# Patient Record
Sex: FEMALE | Race: WHITE | Employment: UNEMPLOYED | URBAN - METROPOLITAN AREA
[De-identification: names, ages, dates, MRNs, and addresses within clinical notes are randomized per-mention and may not be internally consistent; named-entity substitution may affect disease eponyms.]

---
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
## 2022-02-03 ENCOUNTER — OFFICE VISIT (OUTPATIENT)
Dept: DERMATOLOGY | Facility: CLINIC | Age: 22
End: 2022-02-03
Payer: COMMERCIAL

## 2022-02-03 VITALS
DIASTOLIC BLOOD PRESSURE: 86 MMHG | OXYGEN SATURATION: 95 % | RESPIRATION RATE: 16 BRPM | SYSTOLIC BLOOD PRESSURE: 127 MMHG | HEART RATE: 55 BPM

## 2022-02-03 DIAGNOSIS — L63.9 ALOPECIA AREATA: Primary | ICD-10-CM

## 2022-02-03 PROCEDURE — 11901 INJECT SKIN LESIONS >7: CPT | Mod: GC

## 2022-02-03 ASSESSMENT — PAIN SCALES - GENERAL
PAINLEVEL: NO PAIN (0)
PAINLEVEL: NO PAIN (0)

## 2022-02-03 NOTE — Clinical Note
2/3/2022       RE: Garry Ferreira  [address] [address]     Dear Colleague,    Thank you for referring your patient, Garry Ferreira, to the [facility] DERMATOLOGY CLINIC [place] at [facility]. Please see a copy of my visit note below.    [facility] Dermatology Note  Encounter Date: Feb 3, 2022  Office Visit     Dermatology Problem List:  1.  Alopecia areata. Prior: tofacitinib 5mg BID (continued to progress), augmented betamethasone. S/p prednisone taper starting at 40mg tapered over 6 weeks (initiated 5/2021), now 5mg qd.   - Current tx: Prednisone 5mg (2.5mgX2) daily, Minoxidil 1.25mg (1/2tab) daily, IL-K injections (last 2/3/22), ketoconazole shampoo  ____________________________________________     Assessment & Plan:      # Alopecia Areata. Chronic. Steroid responsive with regular IL-K injections and prednisone burst for active disease. Previously on tofacitinib 5mg BID however did not see improvement and in fact progression continued while on this medication, thus patient preference to discontinue and we agree. Given continued progression despite increased amounts of intralesional steroid, patient completed prednisone taper starting at 40mg daily and tapered over 6 weeks. She is now continued on 5mg daily given her progress and risk of flare post-taper, with the addition of oral minoxidil 1.25mg daily. We are seeing what appears to be sustained improvement on the frontal anterior and scalp vertex while on this regimen and thus feel comfortable shifting the focus to the parietotemporal scalp at this time. Advised that she reach out immediately if any negative changes in the recently improved area.   - IL-K injections today (see procedure note below)    - Continue prednisone 5mg daily (2 pills of 2.5mg each, per day)  - Continue half-tablet of minoxidil daily (1.25mg per day)   - Continue ketoconazole shampoo  3-4x/week, leave on 5 min prior to rinsing   - Continue augmented betamethasone cream weekly (prefers cream to ointment)  - Continue latisse for eyebrows as needed       * Given degree of improvement on top of scalp today, the majority of IL-K today was injected into the fronto-parietal scalp bilaterally. Encouraged patient to continue watching for changes on the top of scalp and advised that she call for an appointment for injections in this area if any changes in the wrong direction, as we would like to maintain and not lose the progress we've made thus far. Such a change would qualify for an overbook for injections, if needed.      Procedures Performed:   - Intra-lesional triamcinolone procedure note. After positioning and cleansing with isopropyl alcohol, 4 total mL of triamcinolone 10 mg/mL was injected into 14 lesion(s) on the scalp. The patient tolerated the procedure well and left the dermatology clinic in good condition.     Follow-up: 6-8 week(s) in-person, or earlier for new or changing lesions    Staff and Resident:     Tammy Ospina MD PGY3    Patient was staffed with Dr. Marquez  ____________________________________________    CC: Derm Problem (Garry is here for an 8 week follow up and said things have been going about the same, she has no new areas of concern today.)    HPI:  Ms. Garry Ferreira is a(n) 22 year old female who presents today as a return patient for hair loss secondary to alopecia areata. She continues on 5mg prednisone and 1.25mg minoxidil since last visit. She continues to see growth along the areas injected at last visit, and she is happy with this improvement. Patient is otherwise feeling well, without additional skin concerns.    Labs Reviewed:  N/A    Physical Exam:  Vitals: There were no vitals taken for this visit.  SKIN: Focused examination of hair and scalp was performed.  - patchy areas on parietotemporal scalp persist with visible islands of regrowth; frontal hairline and  posterior scalp continues to improve  - eyebrows and eyelashes normal density   - hair pull test negative   - No other lesions of concern on areas examined.     Medications:  Current Outpatient Medications   Medication     augmented betamethasone dipropionate (DIPROLENE-AF) 0.05 % external cream     augmented betamethasone dipropionate (DIPROLENE-AF) 0.05 % external ointment     clobetasol (TEMOVATE) 0.05 % external cream     clobetasol propionate (CLOBEX) 0.05 % external shampoo     ketoconazole (NIZORAL) 2 % external shampoo     minoxidil (LONITEN) 2.5 MG tablet     predniSONE (DELTASONE) 2.5 MG tablet     tofacitinib (XELJANZ) 5 MG PO tablet     Current Facility-Administered Medications   Medication     triamcinolone acetonide (KENALOG-10) injection 20 mg     triamcinolone acetonide (KENALOG-10) injection 40 mg     triamcinolone acetonide (KENALOG-10) injection 40 mg     triamcinolone acetonide (KENALOG-10) injection 40 mg     triamcinolone acetonide (KENALOG-10) injection 40 mg     triamcinolone acetonide (KENALOG-10) injection 40 mg     triamcinolone acetonide (KENALOG-10) injection 46 mg      Past Medical History:   Patient Active Problem List   Diagnosis     Alopecia areata     Medication monitoring encounter     No past medical history on file.        Again, thank you for allowing me to participate in the care of your patient.      Sincerely,    Dago Ospina MD

## 2022-02-03 NOTE — LETTER
Date:February 11, 2022      Patient was self referred, no letter generated. Do not send.        [facility] Health Information

## 2022-02-03 NOTE — NURSING NOTE
Drug Administration Record    Prior to injection, verified patient identity using patient's name and date of birth.  Due to injection administration, patient instructed to remain in clinic for 15 minutes  afterwards, and to report any adverse reaction to me immediately.    Drug Name: triamcinolone acetonide(kenalog)  Dose: 3 mL of triamcinolone 10mg/mL, 30 mg dose  Route administered: ID  NDC #: Kenalog-10 (3865-1691-39)  Amount of waste(mL): 2  Reason for waste: Single use vial    LOT #: [id number]  SITE: See providers note  : [employer] 3D  EXPIRATION DATE: 04/2023

## 2022-02-03 NOTE — PROGRESS NOTES
[facility] Dermatology Note  Encounter Date: Feb 3, 2022  Office Visit     Dermatology Problem List:  1.  Alopecia areata. Prior: tofacitinib 5mg BID (continued to progress), augmented betamethasone. S/p prednisone taper starting at 40mg tapered over 6 weeks (initiated 5/2021), now 5mg qd.   - Current tx: Prednisone 5mg (2.5mgX2) daily, Minoxidil 1.25mg (1/2tab) daily, IL-K injections (last 2/3/22), ketoconazole shampoo  ____________________________________________     Assessment & Plan:      # Alopecia Areata. Chronic. Steroid responsive with regular IL-K injections and prednisone burst for active disease. Previously on tofacitinib 5mg BID however did not see improvement and in fact progression continued while on this medication, thus patient preference to discontinue and we agree. Given continued progression despite increased amounts of intralesional steroid, patient completed prednisone taper starting at 40mg daily and tapered over 6 weeks. She is now continued on 5mg daily given her progress and risk of flare post-taper, with the addition of oral minoxidil 1.25mg daily. We are seeing what appears to be sustained improvement on the frontal anterior and scalp vertex while on this regimen and thus feel comfortable shifting the focus to the parietotemporal scalp at this time. Advised that she reach out immediately if any negative changes in the recently improved area.   - IL-K injections today (see procedure note below)    - Continue prednisone 5mg daily (2 pills of 2.5mg each, per day)  - Continue half-tablet of minoxidil daily (1.25mg per day)   - Continue ketoconazole shampoo 3-4x/week, leave on 5 min prior to rinsing   - Continue augmented betamethasone cream weekly (prefers cream to ointment)  - Continue latisse for eyebrows as needed       * Given degree of improvement on top of scalp today, the majority of IL-K today was injected into the fronto-parietal scalp bilaterally. Encouraged  patient to continue watching for changes on the top of scalp and advised that she call for an appointment for injections in this area if any changes in the wrong direction, as we would like to maintain and not lose the progress we've made thus far. Such a change would qualify for an overbook for injections, if needed.      Procedures Performed:   - Intra-lesional triamcinolone procedure note. After positioning and cleansing with isopropyl alcohol, 4 total mL of triamcinolone 10 mg/mL was injected into 14 lesion(s) on the scalp. The patient tolerated the procedure well and left the dermatology clinic in good condition.     Follow-up: 6-8 week(s) in-person, or earlier for new or changing lesions    Staff and Resident:     Tammy Ospina MD PGY3    Patient was staffed with Dr. Melvin    Patient was seen and examined with the dermatology resident. I agree with the history, review of systems, physical examination, assessments and plan. I was present for the key portion of the ILK procedure.    Sandhya Melvin MD  Professor and  Chair  Department of Dermatology  [facility]  ____________________________________________    CC: Derm Problem (Garry is here for an 8 week follow up and said things have been going about the same, she has no new areas of concern today.)    HPI:  Ms. Garry Ferreira is a(n) 22 year old female who presents today as a return patient for hair loss secondary to alopecia areata. She continues on 5mg prednisone and 1.25mg minoxidil since last visit. She continues to see growth along the areas injected at last visit, and she is happy with this improvement. Patient is otherwise feeling well, without additional skin concerns.    Labs Reviewed:  N/A    Physical Exam:  Vitals: There were no vitals taken for this visit.  SKIN: Focused examination of hair and scalp was performed.  - patchy areas on parietotemporal scalp persist with visible islands of regrowth; frontal hairline and posterior  scalp continues to improve  - eyebrows and eyelashes normal density   - hair pull test negative   - No other lesions of concern on areas examined.     Medications:  Current Outpatient Medications   Medication     augmented betamethasone dipropionate (DIPROLENE-AF) 0.05 % external cream     augmented betamethasone dipropionate (DIPROLENE-AF) 0.05 % external ointment     clobetasol (TEMOVATE) 0.05 % external cream     clobetasol propionate (CLOBEX) 0.05 % external shampoo     ketoconazole (NIZORAL) 2 % external shampoo     minoxidil (LONITEN) 2.5 MG tablet     predniSONE (DELTASONE) 2.5 MG tablet     tofacitinib (XELJANZ) 5 MG PO tablet     Current Facility-Administered Medications   Medication     triamcinolone acetonide (KENALOG-10) injection 20 mg     triamcinolone acetonide (KENALOG-10) injection 40 mg     triamcinolone acetonide (KENALOG-10) injection 40 mg     triamcinolone acetonide (KENALOG-10) injection 40 mg     triamcinolone acetonide (KENALOG-10) injection 40 mg     triamcinolone acetonide (KENALOG-10) injection 40 mg     triamcinolone acetonide (KENALOG-10) injection 46 mg      Past Medical History:   Patient Active Problem List   Diagnosis     Alopecia areata     Medication monitoring encounter     No past medical history on file.

## 2022-02-03 NOTE — NURSING NOTE
Chief Complaint   Patient presents with     Derm Problem     Garry is here for an 8 week follow up and said things have been going about the same, she has no new areas of concern today.     León Oneal, Paramedic

## 2022-03-31 ENCOUNTER — OFFICE VISIT (OUTPATIENT)
Dept: DERMATOLOGY | Facility: CLINIC | Age: 22
End: 2022-03-31
Payer: COMMERCIAL

## 2022-03-31 DIAGNOSIS — L63.9 ALOPECIA AREATA: Primary | ICD-10-CM

## 2022-03-31 DIAGNOSIS — Z51.81 MEDICATION MONITORING ENCOUNTER: ICD-10-CM

## 2022-03-31 PROCEDURE — 11901 INJECT SKIN LESIONS >7: CPT | Mod: GC

## 2022-03-31 ASSESSMENT — PAIN SCALES - GENERAL: PAINLEVEL: NO PAIN (0)

## 2022-03-31 NOTE — LETTER
3/31/2022       RE: Garry Ferreira  [address] [facility] [address]     Dear Colleague,    Thank you for referring your patient, Garry Ferreira, to the [facility] DERMATOLOGY CLINIC [place] at [facility]. Please see a copy of my visit note below.    [facility] Dermatology Note  Encounter Date: Mar 31, 2022  Office Visit     Dermatology Problem List:  1.  Alopecia areata. Prior: tofacitinib 5mg BID (continued to progress), augmented betamethasone. S/p prednisone taper starting at 40mg tapered over 6 weeks (initiated 5/2021), now 5mg qd.   - Current tx: Prednisone 5mg (2.5mgX2) daily, Minoxidil 1.25mg (1/2tab) daily, IL-K injections (last 2/3/22), ketoconazole shampoo  ____________________________________________     Assessment & Plan:      # Alopecia Areata. Chronic. Steroid responsive with regular IL-K injections and prednisone burst for active disease. Previously on tofacitinib 5mg BID however did not see improvement and in fact progression continued while on this medication, thus patient preference to discontinue and we agree. Given continued progression despite increased amounts of intralesional steroid, patient completed prednisone taper starting at 40mg daily and tapered over 6 weeks. She is now continued on 5mg daily given her progress and risk of flare post-taper, with the addition of oral minoxidil 1.25mg daily. We are seeing what appears to be sustained improvement on the frontal anterior and scalp vertex while on this regimen and thus feel comfortable shifting the focus to the parietotemporal scalp at this time. Advised that she reach out immediately if any negative changes in the recently improved area.   - IL-K injections today (see procedure note below)    - Continue prednisone 5mg daily (2 pills of 2.5mg each, per day)  - Continue half-tablet of minoxidil daily (1.25mg per day)   - Continue ketoconazole shampoo  3-4x/week, leave on 5 min prior to rinsing   - Continue augmented betamethasone cream weekly (prefers cream to ointment)  - Continue latisse for eyebrows as needed       Procedures Performed:   - Intra-lesional triamcinolone procedure note. After positioning and cleansing with isopropyl alcohol, 4 total mL of triamcinolone 10 mg/mL was injected into 14 lesion(s) on the scalp. The patient tolerated the procedure well and left the dermatology clinic in good condition.     Follow-up: 6-8 week(s) in-person, or earlier for new or changing lesions    Staff and Resident:     Tammy Ospina MD PGY3    Patient was staffed with Dr. Melvin    Patient was seen and examined with the dermatology resident. I agree with the history, review of systems, physical examination, assessments and plan. I was present for the key portion of the ILK procedure.    Sandhya Melvin MD  Professor and  Chair  Department of Dermatology  [facility]        ____________________________________________    CC: Hair/Scalp Problem (hairloss)    HPI:  Ms. Garry Ferreira is a(n) 22 year old female who presents today as a return patient for hair loss secondary to alopecia areata. She continues on 5mg prednisone and 1.25mg minoxidil since last visit. She continues to see growth along the areas injected at last visit, and she is happy with this improvement. Patient is otherwise feeling well, without additional skin concerns.    Labs Reviewed:  N/A    Physical Exam:  SKIN: Focused examination of hair and scalp was performed.  - patchy areas on parietotemporal scalp persist with visible islands of regrowth; frontal hairline and posterior scalp continues to improve (see photos)  - eyebrows and eyelashes normal density   - hair pull test negative   - No other lesions of concern on areas examined.     Medications:  Current Outpatient Medications   Medication     augmented betamethasone dipropionate (DIPROLENE-AF) 0.05 % external cream     augmented  betamethasone dipropionate (DIPROLENE-AF) 0.05 % external ointment     clobetasol (TEMOVATE) 0.05 % external cream     clobetasol propionate (CLOBEX) 0.05 % external shampoo     ketoconazole (NIZORAL) 2 % external shampoo     minoxidil (LONITEN) 2.5 MG tablet     predniSONE (DELTASONE) 2.5 MG tablet     tofacitinib (XELJANZ) 5 MG PO tablet     Current Facility-Administered Medications   Medication     triamcinolone acetonide (KENALOG-10) injection 20 mg     triamcinolone acetonide (KENALOG-10) injection 40 mg     triamcinolone acetonide (KENALOG-10) injection 40 mg     triamcinolone acetonide (KENALOG-10) injection 40 mg     triamcinolone acetonide (KENALOG-10) injection 40 mg     triamcinolone acetonide (KENALOG-10) injection 40 mg     triamcinolone acetonide (KENALOG-10) injection 40 mg     triamcinolone acetonide (KENALOG-10) injection 46 mg      Past Medical History:   Patient Active Problem List   Diagnosis     Alopecia areata     Medication monitoring encounter     No past medical history on file.      Again, thank you for allowing me to participate in the care of your patient.      Sincerely,    Dago Ospina MD

## 2022-03-31 NOTE — NURSING NOTE
Drug Administration Record    Prior to injection, verified patient identity using patient's name and date of birth.  Due to injection administration, patient instructed to remain in clinic for 15 minutes  afterwards, and to report any adverse reaction to me immediately.    Drug Name: triamcinolone acetonide(kenalog)  Dose: 4mL of triamcinolone 10mg/mL, 40mg dose  Route administered: ID  NDC #: Kenalog-10 (8796-3568-31)  Amount of waste(mL):1mL  Reason for waste: Multi dose vial    LOT #: [id number]  SITE: [facility]  : [id number]  EXPIRATION DATE: 05/2023

## 2022-03-31 NOTE — LETTER
Date:April 18, 2022      Patient was self referred, no letter generated. Do not send.        [facility] Health Information

## 2022-03-31 NOTE — NURSING NOTE
Dermatology Rooming Note    Garry Ferreira's goals for this visit include:   Chief Complaint   Patient presents with     Hair/Scalp Problem     billielovaleriano Goldman, Visit Facilitator

## 2022-03-31 NOTE — PROGRESS NOTES
[facility] Dermatology Note  Encounter Date: Mar 31, 2022  Office Visit     Dermatology Problem List:  1.  Alopecia areata. Prior: tofacitinib 5mg BID (continued to progress), augmented betamethasone. S/p prednisone taper starting at 40mg tapered over 6 weeks (initiated 5/2021), now 5mg qd.   - Current tx: Prednisone 5mg (2.5mgX2) daily, Minoxidil 1.25mg (1/2tab) daily, IL-K injections (last 2/3/22), ketoconazole shampoo  ____________________________________________     Assessment & Plan:      # Alopecia Areata. Chronic. Steroid responsive with regular IL-K injections and prednisone burst for active disease. Previously on tofacitinib 5mg BID however did not see improvement and in fact progression continued while on this medication, thus patient preference to discontinue and we agree. Given continued progression despite increased amounts of intralesional steroid, patient completed prednisone taper starting at 40mg daily and tapered over 6 weeks. She is now continued on 5mg daily given her progress and risk of flare post-taper, with the addition of oral minoxidil 1.25mg daily. We are seeing what appears to be sustained improvement on the frontal anterior and scalp vertex while on this regimen and thus feel comfortable shifting the focus to the parietotemporal scalp at this time. Advised that she reach out immediately if any negative changes in the recently improved area.   - IL-K injections today (see procedure note below)    - Continue prednisone 5mg daily (2 pills of 2.5mg each, per day)  - Continue half-tablet of minoxidil daily (1.25mg per day)   - Continue ketoconazole shampoo 3-4x/week, leave on 5 min prior to rinsing   - Continue augmented betamethasone cream weekly (prefers cream to ointment)  - Continue latisse for eyebrows as needed       Procedures Performed:   - Intra-lesional triamcinolone procedure note. After positioning and cleansing with isopropyl alcohol, 4 total mL of  triamcinolone 10 mg/mL was injected into 14 lesion(s) on the scalp. The patient tolerated the procedure well and left the dermatology clinic in good condition.     Follow-up: 6-8 week(s) in-person, or earlier for new or changing lesions    Staff and Resident:     Tammy Ospina MD PGY3    Patient was staffed with Dr. Melvin    Patient was seen and examined with the dermatology resident. I agree with the history, review of systems, physical examination, assessments and plan. I was present for the key portion of the ILK procedure.    Sandhya Melvin MD  Professor and  Chair  Department of Dermatology  [facility]        ____________________________________________    CC: Hair/Scalp Problem (hairloss)    HPI:  Ms. Garry Ferreira is a(n) 22 year old female who presents today as a return patient for hair loss secondary to alopecia areata. She continues on 5mg prednisone and 1.25mg minoxidil since last visit. She continues to see growth along the areas injected at last visit, and she is happy with this improvement. Patient is otherwise feeling well, without additional skin concerns.    Labs Reviewed:  N/A    Physical Exam:  SKIN: Focused examination of hair and scalp was performed.  - patchy areas on parietotemporal scalp persist with visible islands of regrowth; frontal hairline and posterior scalp continues to improve (see photos)  - eyebrows and eyelashes normal density   - hair pull test negative   - No other lesions of concern on areas examined.     Medications:  Current Outpatient Medications   Medication     augmented betamethasone dipropionate (DIPROLENE-AF) 0.05 % external cream     augmented betamethasone dipropionate (DIPROLENE-AF) 0.05 % external ointment     clobetasol (TEMOVATE) 0.05 % external cream     clobetasol propionate (CLOBEX) 0.05 % external shampoo     ketoconazole (NIZORAL) 2 % external shampoo     minoxidil (LONITEN) 2.5 MG tablet     predniSONE (DELTASONE) 2.5 MG tablet      tofacitinib (XELJANZ) 5 MG PO tablet     Current Facility-Administered Medications   Medication     triamcinolone acetonide (KENALOG-10) injection 20 mg     triamcinolone acetonide (KENALOG-10) injection 40 mg     triamcinolone acetonide (KENALOG-10) injection 40 mg     triamcinolone acetonide (KENALOG-10) injection 40 mg     triamcinolone acetonide (KENALOG-10) injection 40 mg     triamcinolone acetonide (KENALOG-10) injection 40 mg     triamcinolone acetonide (KENALOG-10) injection 40 mg     triamcinolone acetonide (KENALOG-10) injection 46 mg      Past Medical History:   Patient Active Problem List   Diagnosis     Alopecia areata     Medication monitoring encounter     No past medical history on file.

## 2022-05-19 ENCOUNTER — OFFICE VISIT (OUTPATIENT)
Dept: DERMATOLOGY | Facility: CLINIC | Age: 22
End: 2022-05-19
Payer: COMMERCIAL

## 2022-05-19 VITALS — DIASTOLIC BLOOD PRESSURE: 76 MMHG | SYSTOLIC BLOOD PRESSURE: 122 MMHG

## 2022-05-19 DIAGNOSIS — M35.9 AUTOIMMUNE DISEASE (H): ICD-10-CM

## 2022-05-19 DIAGNOSIS — L63.9 ALOPECIA AREATA: Primary | ICD-10-CM

## 2022-05-19 DIAGNOSIS — Z51.81 MEDICATION MONITORING ENCOUNTER: ICD-10-CM

## 2022-05-19 PROCEDURE — 11901 INJECT SKIN LESIONS >7: CPT | Mod: GC

## 2022-05-19 ASSESSMENT — PAIN SCALES - GENERAL: PAINLEVEL: NO PAIN (0)

## 2022-05-19 NOTE — LETTER
5/19/2022       RE: Garry Ferreira  [address] [facility] [address]     Dear Colleague,    Thank you for referring your patient, Garry Ferreira, to the [facility] DERMATOLOGY CLINIC [place] at [facility]. Please see a copy of my visit note below.    [facility] Dermatology Note  Encounter Date: May 19, 2022  Office Visit     Dermatology Problem List:  1.  Alopecia areata. Prior: tofacitinib 5mg BID (continued to progress), augmented betamethasone. S/p prednisone taper starting at 40mg tapered over 6 weeks (initiated 5/2021), now 5mg qd.   - Current tx: Prednisone 5mg (2.5mgX2) daily, Minoxidil 1.25mg (1/2tab) daily, IL-K injections (last 2/3/22), ketoconazole shampoo, latisse   ____________________________________________     Assessment & Plan:      # Alopecia Areata. Chronic. Steroid responsive with regular IL-K injections, low-dose oral prednisone (5mg daily), and oral minoxidil (1.25mg daily).   - IL-K injections today (see procedure note below)    - Continue prednisone 5mg daily (2 pills of 2.5mg each, per day)  - Continue half-tablet of minoxidil daily (1.25mg per day)   - Continue ketoconazole shampoo 3-4x/week, leave on 5 min prior to rinsing   - Continue latisse for eyebrows as needed       Procedures Performed:   - Intra-lesional triamcinolone procedure note. After positioning and cleansing with isopropyl alcohol, 4 total mL of triamcinolone 10 mg/mL was injected into 14 lesion(s) on the scalp. The patient tolerated the procedure well and left the dermatology clinic in good condition.     Follow-up: 6-8 week(s) in-person, or earlier for new or changing lesions    Staff and Resident:     Tammy Ospina MD PGY3    Patient was staffed with Dr. Melvin     Patient was seen and examined with the dermatology resident. I agree with the history, review of systems, physical examination, assessments and plan. I was present for  the key portion of the ILK injections.     Sandhya Melvin MD  Professor and  Chair  Department of Dermatology  [facility]  ____________________________________________    CC: Hair Loss (Garry is here today for a follow up for ILK. She says things have been about the same since her last visit.)    HPI:  Ms. Garry Ferreira is a(n) 22 year old female who presents today as a return patient for hair loss secondary to alopecia areata. She continues on 5mg prednisone and 1.25mg minoxidil since last visit. She continues to see growth along the areas injected at last visit, and she is happy with this improvement. Patient is otherwise feeling well, without additional skin concerns.    Labs Reviewed:  N/A    Physical Exam:  SKIN: Focused examination of hair and scalp was performed.  - patchy areas on parietotemporal scalp persist with visible islands of regrowth; frontal hairline and posterior scalp continues to improve (see photos)  - eyebrows and eyelashes normal density   - hair pull test negative   - No other lesions of concern on areas examined.     Medications:  Current Outpatient Medications   Medication     minoxidil (LONITEN) 2.5 MG tablet     predniSONE (DELTASONE) 2.5 MG tablet     augmented betamethasone dipropionate (DIPROLENE-AF) 0.05 % external cream     augmented betamethasone dipropionate (DIPROLENE-AF) 0.05 % external ointment     clobetasol (TEMOVATE) 0.05 % external cream     clobetasol propionate (CLOBEX) 0.05 % external shampoo     ketoconazole (NIZORAL) 2 % external shampoo     tofacitinib (XELJANZ) 5 MG PO tablet     Current Facility-Administered Medications   Medication     triamcinolone acetonide (KENALOG-10) injection 20 mg     triamcinolone acetonide (KENALOG-10) injection 40 mg     triamcinolone acetonide (KENALOG-10) injection 40 mg     triamcinolone acetonide (KENALOG-10) injection 40 mg     triamcinolone acetonide (KENALOG-10) injection 40 mg     triamcinolone acetonide  (KENALOG-10) injection 40 mg     triamcinolone acetonide (KENALOG-10) injection 40 mg     triamcinolone acetonide (KENALOG-10) injection 40 mg     triamcinolone acetonide (KENALOG-10) injection 46 mg      Past Medical History:   Patient Active Problem List   Diagnosis     Alopecia areata     Medication monitoring encounter     No past medical history on file.      Again, thank you for allowing me to participate in the care of your patient.      Sincerely,    Dago Ospina MD

## 2022-05-19 NOTE — NURSING NOTE
Dermatology Rooming Note    Garry Ferreira's goals for this visit include:   Chief Complaint   Patient presents with     Hair Loss     Garry is here today for a follow up for ILK. She says things have been about the same since her last visit.     Liliana Doan, Ke

## 2022-05-19 NOTE — PROGRESS NOTES
[facility] Dermatology Note  Encounter Date: May 19, 2022  Office Visit     Dermatology Problem List:  1.  Alopecia areata. Prior: tofacitinib 5mg BID (continued to progress), augmented betamethasone. S/p prednisone taper starting at 40mg tapered over 6 weeks (initiated 5/2021), now 5mg qd.   - Current tx: Prednisone 5mg (2.5mgX2) daily, Minoxidil 1.25mg (1/2tab) daily, IL-K injections (last 2/3/22), ketoconazole shampoo, latisse   ____________________________________________     Assessment & Plan:      # Alopecia Areata. Chronic. Steroid responsive with regular IL-K injections, low-dose oral prednisone (5mg daily), and oral minoxidil (1.25mg daily).   - IL-K injections today (see procedure note below)    - Continue prednisone 5mg daily (2 pills of 2.5mg each, per day)  - Continue half-tablet of minoxidil daily (1.25mg per day)   - Continue ketoconazole shampoo 3-4x/week, leave on 5 min prior to rinsing   - Continue latisse for eyebrows as needed       Procedures Performed:   - Intra-lesional triamcinolone procedure note. After positioning and cleansing with isopropyl alcohol, 4 total mL of triamcinolone 10 mg/mL was injected into 14 lesion(s) on the scalp. The patient tolerated the procedure well and left the dermatology clinic in good condition.     Follow-up: 6-8 week(s) in-person, or earlier for new or changing lesions    Staff and Resident:     Tammy Ospina MD PGY3    Patient was staffed with Dr. Melvin     Patient was seen and examined with the dermatology resident. I agree with the history, review of systems, physical examination, assessments and plan. I was present for the key portion of the ILK injections.     Sandhya Melvin MD  Professor and  Chair  Department of Dermatology  [facility]  ____________________________________________    CC: Hair Loss (Garry is here today for a follow up for ILK. She says things have been about the same since her last  visit.)    HPI:  Ms. Garry Ferreira is a(n) 22 year old female who presents today as a return patient for hair loss secondary to alopecia areata. She continues on 5mg prednisone and 1.25mg minoxidil since last visit. She continues to see growth along the areas injected at last visit, and she is happy with this improvement. Patient is otherwise feeling well, without additional skin concerns.    Labs Reviewed:  N/A    Physical Exam:  SKIN: Focused examination of hair and scalp was performed.  - patchy areas on parietotemporal scalp persist with visible islands of regrowth; frontal hairline and posterior scalp continues to improve (see photos)  - eyebrows and eyelashes normal density   - hair pull test negative   - No other lesions of concern on areas examined.     Medications:  Current Outpatient Medications   Medication     minoxidil (LONITEN) 2.5 MG tablet     predniSONE (DELTASONE) 2.5 MG tablet     augmented betamethasone dipropionate (DIPROLENE-AF) 0.05 % external cream     augmented betamethasone dipropionate (DIPROLENE-AF) 0.05 % external ointment     clobetasol (TEMOVATE) 0.05 % external cream     clobetasol propionate (CLOBEX) 0.05 % external shampoo     ketoconazole (NIZORAL) 2 % external shampoo     tofacitinib (XELJANZ) 5 MG PO tablet     Current Facility-Administered Medications   Medication     triamcinolone acetonide (KENALOG-10) injection 20 mg     triamcinolone acetonide (KENALOG-10) injection 40 mg     triamcinolone acetonide (KENALOG-10) injection 40 mg     triamcinolone acetonide (KENALOG-10) injection 40 mg     triamcinolone acetonide (KENALOG-10) injection 40 mg     triamcinolone acetonide (KENALOG-10) injection 40 mg     triamcinolone acetonide (KENALOG-10) injection 40 mg     triamcinolone acetonide (KENALOG-10) injection 40 mg     triamcinolone acetonide (KENALOG-10) injection 46 mg      Past Medical History:   Patient Active Problem List   Diagnosis     Alopecia areata     Medication  monitoring encounter     No past medical history on file.

## 2022-05-19 NOTE — LETTER
Date:May 22, 2022      Patient was self referred, no letter generated. Do not send.        [facility] Health Information

## 2022-05-19 NOTE — NURSING NOTE
Drug Administration Record    Prior to injection, verified patient identity using patient's name and date of birth.  Due to injection administration, patient instructed to remain in clinic for 15 minutes  afterwards, and to report any adverse reaction to me immediately.    Drug Name: triamcinolone acetonide(kenalog)  Dose: 4mL of triamcinolone 10mg/mL, 40mg dose  Route administered: ID  NDC #: Kenalog-10 (0372-3544-44)  Amount of waste(mL):1  Reason for waste: Single use vial    LOT #: [id number]  SITE: See chart note  : [employer]  EXPIRATION DATE: 09/2023

## 2022-05-21 ENCOUNTER — HEALTH MAINTENANCE LETTER (OUTPATIENT)
Age: 22
End: 2022-05-21

## 2022-06-30 ENCOUNTER — OFFICE VISIT (OUTPATIENT)
Dept: DERMATOLOGY | Facility: CLINIC | Age: 22
End: 2022-06-30
Payer: COMMERCIAL

## 2022-06-30 VITALS — DIASTOLIC BLOOD PRESSURE: 78 MMHG | HEART RATE: 74 BPM | SYSTOLIC BLOOD PRESSURE: 118 MMHG

## 2022-06-30 DIAGNOSIS — L63.9 ALOPECIA AREATA: Primary | ICD-10-CM

## 2022-06-30 PROCEDURE — 11901 INJECT SKIN LESIONS >7: CPT | Mod: GC

## 2022-06-30 ASSESSMENT — PAIN SCALES - GENERAL: PAINLEVEL: NO PAIN (0)

## 2022-06-30 NOTE — NURSING NOTE
Dermatology Rooming Note    Garry Ferreira's goals for this visit include:   Chief Complaint   Patient presents with     Derm Problem     Garry is here for an Alopecia follow-up. States her condition has remained the same since last seen.     Teto White, EMT

## 2022-06-30 NOTE — LETTER
Date:June 30, 2022      Patient was self referred, no letter generated. Do not send.        [facility] Health Information

## 2022-06-30 NOTE — PROGRESS NOTES
[facility] Dermatology Note  Encounter Date: Jun 30, 2022  Office Visit     Dermatology Problem List:  1.  Alopecia areata. Prior: tofacitinib 5mg BID (continued to progress), augmented betamethasone. S/p prednisone taper starting at 40mg tapered over 6 weeks (initiated 5/2021), now 5mg qd.   - Current tx: Prednisone 5mg (2.5mgX2) daily, Minoxidil 1.25mg (1/2tab) daily, IL-K injections q6-8w, ketoconazole shampoo, latisse   ____________________________________________     Assessment & Plan:      # Alopecia Areata. Chronic, active. Steroid responsive with regular IL-K injections, low-dose oral prednisone (5mg daily), and oral minoxidil (1.25mg daily).   - IL-K injections today (see procedure note below)    - Continue prednisone 5mg daily (2 pills of 2.5mg each, per day)  - Continue half-tablet of minoxidil daily (1.25mg per day)   - Continue ketoconazole shampoo 3-4x/week, leave on 5 min prior to rinsing   - Continue latisse for eyebrows as needed    - Consider adding lidex solution at next visit      Procedures Performed:   - Intra-lesional triamcinolone procedure note. After positioning and cleansing with isopropyl alcohol, 4 total mL of triamcinolone 10 mg/mL was injected into 14 lesion(s) on the scalp. The patient tolerated the procedure well and left the dermatology clinic in good condition.     Follow-up: 6-8 week(s) in-person, or earlier for new or changing lesions    Staff and Resident:     Tammy Ospina MD PGY3    Patient was staffed with Dr. Kevin YORK, Tressa Brown MD, saw this patient with the resident and agree with the resident s findings and plan of care as documented in the resident s note.  Injections reviewed with resident.     ____________________________________________    CC: Derm Problem (Garry is here for an Alopecia follow-up. States her condition has remained the same since last seen.)    HPI:  Ms. Garry Ferreira is a(n) 22 year old female who presents today as a  return patient for hair loss secondary to alopecia areata. She continues on 5mg prednisone and 1.25mg minoxidil since last visit. She continues to see growth along the areas injected at last visit, however there are focal patches of loss along the anterior hair line that is progressed since last visit. Patient is otherwise feeling well, without additional skin concerns.    Labs Reviewed:  N/A    Physical Exam:  SKIN: Focused examination of hair and scalp was performed.  - patchy areas on parietotemporal scalp persist with visible islands of regrowth; posterior scalp continues to improve; increased focal patches of loss along the anterior hair line  - eyebrows and eyelashes normal density   - hair pull test negative   - No other lesions of concern on areas examined.     Medications:  Current Outpatient Medications   Medication     minoxidil (LONITEN) 2.5 MG tablet     predniSONE (DELTASONE) 2.5 MG tablet     augmented betamethasone dipropionate (DIPROLENE-AF) 0.05 % external cream     augmented betamethasone dipropionate (DIPROLENE-AF) 0.05 % external ointment     clobetasol (TEMOVATE) 0.05 % external cream     clobetasol propionate (CLOBEX) 0.05 % external shampoo     ketoconazole (NIZORAL) 2 % external shampoo     tofacitinib (XELJANZ) 5 MG PO tablet     Current Facility-Administered Medications   Medication     triamcinolone acetonide (KENALOG-10) injection 20 mg     triamcinolone acetonide (KENALOG-10) injection 40 mg     triamcinolone acetonide (KENALOG-10) injection 40 mg     triamcinolone acetonide (KENALOG-10) injection 40 mg     triamcinolone acetonide (KENALOG-10) injection 40 mg     triamcinolone acetonide (KENALOG-10) injection 40 mg     triamcinolone acetonide (KENALOG-10) injection 40 mg     triamcinolone acetonide (KENALOG-10) injection 40 mg     triamcinolone acetonide (KENALOG-10) injection 46 mg      Past Medical History:   Patient Active Problem List   Diagnosis     Alopecia areata     Medication  monitoring encounter     No past medical history on file.

## 2022-06-30 NOTE — NURSING NOTE
Drug Administration Record    Prior to injection, verified patient identity using patient's name and date of birth.  Due to injection administration, patient instructed to remain in clinic for 15 minutes  afterwards, and to report any adverse reaction to me immediately.    Drug Name: triamcinolone acetonide(kenalog)  Dose: 4.0mL of triamcinolone 10mg/mL, 40mg dose  Route administered: ID  NDC #: Kenalog-10 (6531-2601-44)  Amount of waste(mL):1.0mL  Reason for waste: Multi dose vial    LOT #: [id number]  SITE: See Provider Note  : [employer]  EXPIRATION DATE: JAN2024

## 2022-06-30 NOTE — LETTER
6/30/2022       RE: Garry Ferreira  [address] [address]     Dear Colleague,    Thank you for referring your patient, Garry Ferreira, to the [facility] DERMATOLOGY CLINIC [place] at [facility]. Please see a copy of my visit note below.    [facility] Dermatology Note  Encounter Date: Jun 30, 2022  Office Visit     Dermatology Problem List:  1.  Alopecia areata. Prior: tofacitinib 5mg BID (continued to progress), augmented betamethasone. S/p prednisone taper starting at 40mg tapered over 6 weeks (initiated 5/2021), now 5mg qd.   - Current tx: Prednisone 5mg (2.5mgX2) daily, Minoxidil 1.25mg (1/2tab) daily, IL-K injections q6-8w, ketoconazole shampoo, latisse   ____________________________________________     Assessment & Plan:      # Alopecia Areata. Chronic, active. Steroid responsive with regular IL-K injections, low-dose oral prednisone (5mg daily), and oral minoxidil (1.25mg daily).   - IL-K injections today (see procedure note below)    - Continue prednisone 5mg daily (2 pills of 2.5mg each, per day)  - Continue half-tablet of minoxidil daily (1.25mg per day)   - Continue ketoconazole shampoo 3-4x/week, leave on 5 min prior to rinsing   - Continue latisse for eyebrows as needed    - Consider adding lidex solution at next visit      Procedures Performed:   - Intra-lesional triamcinolone procedure note. After positioning and cleansing with isopropyl alcohol, 4 total mL of triamcinolone 10 mg/mL was injected into 14 lesion(s) on the scalp. The patient tolerated the procedure well and left the dermatology clinic in good condition.     Follow-up: 6-8 week(s) in-person, or earlier for new or changing lesions    Staff and Resident:     Tammy Ospina MD PGY3    Patient was staffed with Dr. Kevin YORK, Tressa Brown MD, saw this patient with the resident and agree with the resident s findings and plan of care as  documented in the resident s note.  Injections reviewed with resident.     ____________________________________________    CC: Derm Problem (Garry is here for an Alopecia follow-up. States her condition has remained the same since last seen.)    HPI:  Ms. Garry Ferreira is a(n) 22 year old female who presents today as a return patient for hair loss secondary to alopecia areata. She continues on 5mg prednisone and 1.25mg minoxidil since last visit. She continues to see growth along the areas injected at last visit, however there are focal patches of loss along the anterior hair line that is progressed since last visit. Patient is otherwise feeling well, without additional skin concerns.    Labs Reviewed:  N/A    Physical Exam:  SKIN: Focused examination of hair and scalp was performed.  - patchy areas on parietotemporal scalp persist with visible islands of regrowth; posterior scalp continues to improve; increased focal patches of loss along the anterior hair line  - eyebrows and eyelashes normal density   - hair pull test negative   - No other lesions of concern on areas examined.     Medications:  Current Outpatient Medications   Medication     minoxidil (LONITEN) 2.5 MG tablet     predniSONE (DELTASONE) 2.5 MG tablet     augmented betamethasone dipropionate (DIPROLENE-AF) 0.05 % external cream     augmented betamethasone dipropionate (DIPROLENE-AF) 0.05 % external ointment     clobetasol (TEMOVATE) 0.05 % external cream     clobetasol propionate (CLOBEX) 0.05 % external shampoo     ketoconazole (NIZORAL) 2 % external shampoo     tofacitinib (XELJANZ) 5 MG PO tablet     Current Facility-Administered Medications   Medication     triamcinolone acetonide (KENALOG-10) injection 20 mg     triamcinolone acetonide (KENALOG-10) injection 40 mg     triamcinolone acetonide (KENALOG-10) injection 40 mg     triamcinolone acetonide (KENALOG-10) injection 40 mg     triamcinolone acetonide (KENALOG-10) injection 40 mg      triamcinolone acetonide (KENALOG-10) injection 40 mg     triamcinolone acetonide (KENALOG-10) injection 40 mg     triamcinolone acetonide (KENALOG-10) injection 40 mg     triamcinolone acetonide (KENALOG-10) injection 46 mg      Past Medical History:   Patient Active Problem List   Diagnosis     Alopecia areata     Medication monitoring encounter     No past medical history on file.      Again, thank you for allowing me to participate in the care of your patient.      Sincerely,    Dago Ospina MD

## 2022-08-11 ENCOUNTER — OFFICE VISIT (OUTPATIENT)
Dept: DERMATOLOGY | Facility: CLINIC | Age: 22
End: 2022-08-11
Payer: COMMERCIAL

## 2022-08-11 VITALS — DIASTOLIC BLOOD PRESSURE: 83 MMHG | HEART RATE: 95 BPM | SYSTOLIC BLOOD PRESSURE: 125 MMHG

## 2022-08-11 DIAGNOSIS — L65.9 LOSS OF HAIR: ICD-10-CM

## 2022-08-11 DIAGNOSIS — L63.9 ALOPECIA AREATA: Primary | ICD-10-CM

## 2022-08-11 PROCEDURE — 11901 INJECT SKIN LESIONS >7: CPT | Mod: GC | Performed by: DERMATOLOGY

## 2022-08-11 RX ORDER — PREDNISONE 2.5 MG/1
5 TABLET ORAL DAILY
Qty: 60 TABLET | Refills: 3 | Status: SHIPPED | OUTPATIENT
Start: 2022-08-11 | End: 2022-09-22

## 2022-08-11 RX ORDER — FLUOCINONIDE TOPICAL SOLUTION USP, 0.05% 0.5 MG/ML
SOLUTION TOPICAL
Qty: 60 ML | Refills: 5 | Status: SHIPPED | OUTPATIENT
Start: 2022-08-11 | End: 2023-04-20

## 2022-08-11 RX ORDER — MINOXIDIL 2.5 MG/1
TABLET ORAL
Qty: 15 TABLET | Refills: 4 | Status: SHIPPED | OUTPATIENT
Start: 2022-08-11 | End: 2023-01-30

## 2022-08-11 ASSESSMENT — PAIN SCALES - GENERAL: PAINLEVEL: NO PAIN (0)

## 2022-08-11 NOTE — NURSING NOTE
Drug Administration Record    Prior to injection, verified patient identity using patient's name and date of birth.  Due to injection administration, patient instructed to remain in clinic for 15 minutes  afterwards, and to report any adverse reaction to me immediately.    Drug Name: triamcinolone acetonide(kenalog)  Dose: 4.0mL of triamcinolone 10mg/mL, 40mg dose  Route administered: ID  NDC #: Kenalog-10 (5566-7421-94)  Amount of waste(mL):1.0mL  Reason for waste: Multi dose vial    LOT #: [id number]  SITE: See Provider Note  : [employer]  EXPIRATION DATE: FEB2024

## 2022-08-11 NOTE — LETTER
8/11/2022       RE: Garry Ferreira  [address] [address]     Dear Colleague,    Thank you for referring your patient, Garry Ferreira, to the [facility] DERMATOLOGY CLINIC [place] at [facility]. Please see a copy of my visit note below.    [facility] Dermatology Note  Encounter Date: Aug 11, 2022  Office Visit     Dermatology Problem List:  1.  Alopecia areata. Prior: tofacitinib 5mg BID (continued to progress), augmented betamethasone. S/p prednisone taper starting at 40mg tapered over 6 weeks (initiated 5/2021), now 5mg qd.   - Current tx: Prednisone 5mg (2.5mgX2) daily, Minoxidil 1.25mg (1/2tab) daily, lidex solution daily, IL-K injections q6-8w, ketoconazole shampoo, latisse   ____________________________________________     Assessment & Plan:      # Alopecia Areata. Chronic, active. Steroid responsive with regular IL-K injections, low-dose oral prednisone (5mg daily), and oral minoxidil (1.25mg daily). Previously on xeljanz for ~1 year however not well tolerated (GI bleed).   - IL-K injections today (see procedure note below)   - Start lidex solution daily   - Continue prednisone 5mg daily (2 pills of 2.5mg each, per day)  - Continue half-tablet of minoxidil daily (1.25mg per day)   - Continue ketoconazole shampoo 3-4x/week, leave on 5 min prior to rinsing   - Continue latisse for eyebrows as needed    - Will send prior auth for opzelura (ruxolitinib) cream; BID to affected areas   [PMID: 54788735; PMID: [id number]]     Procedures Performed:   - Intra-lesional triamcinolone procedure note. After positioning and cleansing with isopropyl alcohol, 4 total mL of triamcinolone 10 mg/mL was injected into 14 lesion(s) on the scalp. The patient tolerated the procedure well and left the dermatology clinic in good condition.     Follow-up: 6-8 week(s) in-person, or earlier for new or changing lesions    Staff and Resident:      Tammy Ospina MD PGY4    Patient was staffed with Dr. Kevin YORK, Tressa Brown MD, saw this patient with the resident and agree with the resident s findings and plan of care as documented in the resident s note.  Injections reviewed with resident.     ____________________________________________    CC: Hair Loss (Garry is here for an Alopecia follow-up. States it has worsened since last seen. States she would like injections as well.)    HPI:  Ms. Garry Ferreira is a(n) 22 year old female who presents today as a return patient for hair loss secondary to alopecia areata. She continues on 5mg prednisone and 1.25mg minoxidil since last visit. She is currently experiencing a flare (mostly on frontal and vertex scalp) which may be related to recent stress related to moving. Patient is otherwise feeling well, without additional skin concerns.    Labs Reviewed:  N/A    Physical Exam:  SKIN: Focused examination of hair and scalp was performed.   - /83   Pulse 95    - patchy areas on parietotemporal scalp persist with visible islands of regrowth; focal areas of loss on frontal scalp and vertex (progressed from prior) as well as increased focal patches of loss along the anterior hair line   - hair pull test positive today  - eyebrows and eyelashes normal density   - no other lesions of concern on areas examined.     Medications:  Current Outpatient Medications   Medication     minoxidil (LONITEN) 2.5 MG tablet     predniSONE (DELTASONE) 2.5 MG tablet     augmented betamethasone dipropionate (DIPROLENE-AF) 0.05 % external cream     augmented betamethasone dipropionate (DIPROLENE-AF) 0.05 % external ointment     clobetasol (TEMOVATE) 0.05 % external cream     clobetasol propionate (CLOBEX) 0.05 % external shampoo     ketoconazole (NIZORAL) 2 % external shampoo     tofacitinib (XELJANZ) 5 MG PO tablet     Current Facility-Administered Medications   Medication     triamcinolone acetonide (KENALOG-10) injection  20 mg     triamcinolone acetonide (KENALOG-10) injection 40 mg     triamcinolone acetonide (KENALOG-10) injection 40 mg     triamcinolone acetonide (KENALOG-10) injection 40 mg     triamcinolone acetonide (KENALOG-10) injection 40 mg     triamcinolone acetonide (KENALOG-10) injection 40 mg     triamcinolone acetonide (KENALOG-10) injection 40 mg     triamcinolone acetonide (KENALOG-10) injection 40 mg     triamcinolone acetonide (KENALOG-10) injection 46 mg      Past Medical History:   Patient Active Problem List   Diagnosis     Alopecia areata     Medication monitoring encounter     No past medical history on file.      Again, thank you for allowing me to participate in the care of your patient.      Sincerely,    Dago Ospina MD

## 2022-08-11 NOTE — NURSING NOTE
Dermatology Rooming Note    Garry Ferreira's goals for this visit include:   Chief Complaint   Patient presents with     Hair Loss     Garry is here for an Alopecia follow-up. States it has worsened since last seen. States she would like injections as well.     Teto White, EMT

## 2022-08-11 NOTE — LETTER
Date:August 17, 2022      Patient was self referred, no letter generated. Do not send.        [facility] Health Information

## 2022-08-11 NOTE — PROGRESS NOTES
[facility] Dermatology Note  Encounter Date: Aug 11, 2022  Office Visit     Dermatology Problem List:  1.  Alopecia areata. Prior: tofacitinib 5mg BID (continued to progress), augmented betamethasone. S/p prednisone taper starting at 40mg tapered over 6 weeks (initiated 5/2021), now 5mg qd.   - Current tx: Prednisone 5mg (2.5mgX2) daily, Minoxidil 1.25mg (1/2tab) daily, lidex solution daily, IL-K injections q6-8w, ketoconazole shampoo, latisse   ____________________________________________     Assessment & Plan:      # Alopecia Areata. Chronic, active. Steroid responsive with regular IL-K injections, low-dose oral prednisone (5mg daily), and oral minoxidil (1.25mg daily). Previously on xeljanz for ~1 year however not well tolerated (GI bleed).   - IL-K injections today (see procedure note below)   - Start lidex solution daily   - Continue prednisone 5mg daily (2 pills of 2.5mg each, per day)  - Continue half-tablet of minoxidil daily (1.25mg per day)   - Continue ketoconazole shampoo 3-4x/week, leave on 5 min prior to rinsing   - Continue latisse for eyebrows as needed    - Will send prior auth for opzelura (ruxolitinib) cream; BID to affected areas   [PMID: [id number]; PMID: [id number]]     Procedures Performed:   - Intra-lesional triamcinolone procedure note. After positioning and cleansing with isopropyl alcohol, 4 total mL of triamcinolone 10 mg/mL was injected into 14 lesion(s) on the scalp. The patient tolerated the procedure well and left the dermatology clinic in good condition.     Follow-up: 6-8 week(s) in-person, or earlier for new or changing lesions    Staff and Resident:     Tammy Ospina MD PGY4    Patient was staffed with Dr. Kevin YORK, Tressa Brown MD, saw this patient with the resident and agree with the resident s findings and plan of care as documented in the resident s note.  Injections reviewed with resident.     ____________________________________________    CC: Hair  Loss (Garry is here for an Alopecia follow-up. States it has worsened since last seen. States she would like injections as well.)    HPI:  Ms. Garry Ferreira is a(n) 22 year old female who presents today as a return patient for hair loss secondary to alopecia areata. She continues on 5mg prednisone and 1.25mg minoxidil since last visit. She is currently experiencing a flare (mostly on frontal and vertex scalp) which may be related to recent stress related to moving. Patient is otherwise feeling well, without additional skin concerns.    Labs Reviewed:  N/A    Physical Exam:  SKIN: Focused examination of hair and scalp was performed.   - /83   Pulse 95    - patchy areas on parietotemporal scalp persist with visible islands of regrowth; focal areas of loss on frontal scalp and vertex (progressed from prior) as well as increased focal patches of loss along the anterior hair line   - hair pull test positive today  - eyebrows and eyelashes normal density   - no other lesions of concern on areas examined.     Medications:  Current Outpatient Medications   Medication     minoxidil (LONITEN) 2.5 MG tablet     predniSONE (DELTASONE) 2.5 MG tablet     augmented betamethasone dipropionate (DIPROLENE-AF) 0.05 % external cream     augmented betamethasone dipropionate (DIPROLENE-AF) 0.05 % external ointment     clobetasol (TEMOVATE) 0.05 % external cream     clobetasol propionate (CLOBEX) 0.05 % external shampoo     ketoconazole (NIZORAL) 2 % external shampoo     tofacitinib (XELJANZ) 5 MG PO tablet     Current Facility-Administered Medications   Medication     triamcinolone acetonide (KENALOG-10) injection 20 mg     triamcinolone acetonide (KENALOG-10) injection 40 mg     triamcinolone acetonide (KENALOG-10) injection 40 mg     triamcinolone acetonide (KENALOG-10) injection 40 mg     triamcinolone acetonide (KENALOG-10) injection 40 mg     triamcinolone acetonide (KENALOG-10) injection 40 mg     triamcinolone acetonide  (KENALOG-10) injection 40 mg     triamcinolone acetonide (KENALOG-10) injection 40 mg     triamcinolone acetonide (KENALOG-10) injection 46 mg      Past Medical History:   Patient Active Problem List   Diagnosis     Alopecia areata     Medication monitoring encounter     No past medical history on file.

## 2022-08-17 ENCOUNTER — TELEPHONE (OUTPATIENT)
Dept: DERMATOLOGY | Facility: CLINIC | Age: 22
End: 2022-08-17

## 2022-08-17 RX ORDER — RUXOLITINIB 15 MG/G
1 CREAM TOPICAL 2 TIMES DAILY
Qty: 60 G | Refills: 3 | Status: SHIPPED | OUTPATIENT
Start: 2022-08-17 | End: 2023-09-22

## 2022-08-17 NOTE — TELEPHONE ENCOUNTER
To Whom it may Concern:     I am writing regarding your recent denial of ruxolitinib for my patient, Garry Ferreira. Garry is a 22-year-old college student with severe alopecia areata. She was initially referred to [facility] dermatology clinic in 2018 at 18 years old. She has a history of alopecia areata that was diagnosed at 15 months of age. She has had a chronic disease course with substantial worsening over time. Garry has tried and failed numerous topical and systemic medications including diprolene lotion, Kenalog injections, lidex ointment, oral prednisone courses, methotrexate, squaric acid, and most recently oral tofacitinib (5912-0620). In March 2021, she developed a GI bleed (gastric ulcer) thought presumed to be secondary to tofacitinib thus she was taken off this medication.     She is currently being managed on low dose oral prednisone 5 mg daily, oral minoxidil 1.25 mg daily, topical lidex ointment, and routine Kenalog injections. Despite this regimen, her disease continues to progress.     She now has more than 50% hair loss on the scalp, resulting in significant negative psychosocial and functional impacts on her life. We ask that you consider how you would feel personally, if you or a family member of such a young age had to suffer from such extensive disease, and how this would impact your day-to-day life and your self-esteem. As her dermatologist, I am recommending that we trial Ruxolitinib as it has been shown to result in significant benefit for some patients with alopecia areata [PMID: [id number]; PMID: 48905156], and she has failed all other treatment modalities thus far.     Thank you for your consideration.     Sincerely,     Tammy Ospina MD

## 2022-08-17 NOTE — TELEPHONE ENCOUNTER
Central Prior Authorization Team   Phone: [phone]      PRIOR AUTHORIZATION DENIED    Medication: Ruxolitinib Phosphate (OPZELURA) 1.5 % CREA - EPA DENIED    Denial Date: 8/17/2022    Denial Rational:       Appeal Information:

## 2022-08-18 NOTE — TELEPHONE ENCOUNTER
Medication Appeal Initiation    We have initiated an appeal for the requested medication:  Medication: Ruxolitinib Phosphate (OPZELURA) 1.5 % CREA - EPA DENIED  Appeal Start Date:  8/18/2022  Insurance Company: [employer] - Phone [phone] Fax [phone]  Comments:  APPEAL LETTER FAXED.

## 2022-08-25 NOTE — TELEPHONE ENCOUNTER
MEDICATION APPEAL DENIED    Medication: Ruxolitinib Phosphate (OPZELURA) 1.5 % CREA - EPA DENIED    Denial Date: 8/23/2022    Denial Rational:       Second Level Appeal Information:   Second level appeals will be managed by the clinic staff and provider.   Please contact the [employer] Prior Authorization Team if additional information about the denial is needed.

## 2022-09-18 ENCOUNTER — HEALTH MAINTENANCE LETTER (OUTPATIENT)
Age: 22
End: 2022-09-18

## 2022-09-19 NOTE — TELEPHONE ENCOUNTER
Per staff message from Dr. Ospina she will discuss this and possible next steps with patient during follow-up on Thursday.    Teto White, EMT

## 2022-09-22 ENCOUNTER — OFFICE VISIT (OUTPATIENT)
Dept: DERMATOLOGY | Facility: CLINIC | Age: 22
End: 2022-09-22
Payer: COMMERCIAL

## 2022-09-22 VITALS — HEART RATE: 74 BPM | SYSTOLIC BLOOD PRESSURE: 124 MMHG | DIASTOLIC BLOOD PRESSURE: 75 MMHG

## 2022-09-22 DIAGNOSIS — L63.9 ALOPECIA AREATA: Primary | ICD-10-CM

## 2022-09-22 DIAGNOSIS — L65.9 LOSS OF HAIR: ICD-10-CM

## 2022-09-22 PROCEDURE — 2894A PR INJECTION INTO SKIN LESIONS >7: CPT | Mod: GC

## 2022-09-22 RX ORDER — PREDNISONE 2.5 MG/1
10 TABLET ORAL DAILY
Qty: 60 TABLET | Refills: 3 | Status: SHIPPED | OUTPATIENT
Start: 2022-09-22 | End: 2023-02-03

## 2022-09-22 ASSESSMENT — PAIN SCALES - GENERAL: PAINLEVEL: NO PAIN (0)

## 2022-09-22 NOTE — LETTER
Date:September 26, 2022      Patient was self referred, no letter generated. Do not send.        [facility] Health Information

## 2022-09-22 NOTE — PROGRESS NOTES
[facility] Dermatology Note  Encounter Date: Sep 22, 2022  Office Visit     Dermatology Problem List:  1.  Alopecia areata. Prior: tofacitinib 5mg BID (continued to progress), augmented betamethasone. S/p prednisone taper starting at 40mg tapered over 6 weeks (initiated 5/2021), increased from 5mg every day to 10mg daily 9/22/22.   - Current tx: Prednisone 10mg (2.5mgX4) daily, Minoxidil 1.25mg (1/2tab) daily, lidex solution daily, IL-K injections q6-8w, ketoconazole shampoo, latisse   ____________________________________________     Assessment & Plan:      # Alopecia Areata. Chronic, active. Steroid responsive with regular IL-K injections, low-dose oral prednisone (5mg daily), and oral minoxidil (1.25mg daily). Previously on xeljanz for ~1 year however not well tolerated (GI bleed). PA for opzelura (ruxolitinib) cream recently denied x2.   - IL-K injections today (see procedure note below)   - Increase prednisone 5mg daily to 10mg daily (now 4 pills of 2.5mg each, per day)  - Continue half-tablet of minoxidil daily (1.25mg per day)   - Continue lidex solution daily   - Continue ketoconazole shampoo 3-4x/week, leave on 5 min prior to rinsing   - Continue latisse for eyebrows as needed       Procedures Performed:   - Intra-lesional triamcinolone procedure note. After positioning and cleansing with isopropyl alcohol, 4 total mL of triamcinolone 10 mg/mL was injected into 14 lesion(s) on the scalp. The patient tolerated the procedure well and left the dermatology clinic in good condition.     Follow-up: 6-8 week(s) in-person, or earlier for new or changing lesions    Staff and Resident:     Tammy Ospina MD PGY4    Patient was staffed with Dr. Kevin YOKR, Tressa Brown MD, saw this patient with the resident and agree with the resident s findings and plan of care as documented in the resident s note.    ____________________________________________    CC: Hair Loss (Garry is here for an ILK  follow-up. States condition has remained the same since last seen.)    HPI:  Ms. Garry Ferreira is a(n) 22 year old female who presents today as a return patient for hair loss secondary to alopecia areata. Presents to clinic today with mom alongside who offers supplemental history. She continues on 5mg prednisone and 1.25mg minoxidil since last visit. She is currently experiencing a flare affecting majority of scalp, cannot ascertain specific trigger however seems to be steroid responsive as tyshawn of hair regrowth are seen at areas that were previous injected. Patient is otherwise feeling well, without additional skin concerns.    Labs Reviewed:  N/A    Physical Exam:  SKIN: Focused examination of hair and scalp was performed.   - /75   Pulse 74    - small tyshawn of regrowth seen within wider patches of alopecia across noted most prominently on bilateral parietal and posterior scalp; hair pull test positive today; eyebrows and eyelashes normal density; no other lesions of concern on areas examined.     Medications:  Current Outpatient Medications   Medication     fluocinonide (LIDEX) 0.05 % external solution     minoxidil (LONITEN) 2.5 MG tablet     predniSONE (DELTASONE) 2.5 MG tablet     augmented betamethasone dipropionate (DIPROLENE-AF) 0.05 % external cream     augmented betamethasone dipropionate (DIPROLENE-AF) 0.05 % external ointment     clobetasol (TEMOVATE) 0.05 % external cream     clobetasol propionate (CLOBEX) 0.05 % external shampoo     ketoconazole (NIZORAL) 2 % external shampoo     Ruxolitinib Phosphate (OPZELURA) 1.5 % CREA     tofacitinib (XELJANZ) 5 MG PO tablet     Current Facility-Administered Medications   Medication     triamcinolone acetonide (KENALOG-10) injection 20 mg     triamcinolone acetonide (KENALOG-10) injection 40 mg     triamcinolone acetonide (KENALOG-10) injection 40 mg     triamcinolone acetonide (KENALOG-10) injection 40 mg     triamcinolone acetonide (KENALOG-10)  injection 40 mg     triamcinolone acetonide (KENALOG-10) injection 40 mg     triamcinolone acetonide (KENALOG-10) injection 40 mg     triamcinolone acetonide (KENALOG-10) injection 40 mg     triamcinolone acetonide (KENALOG-10) injection 46 mg      Past Medical History:   Patient Active Problem List   Diagnosis     Alopecia areata     Medication monitoring encounter     No past medical history on file.

## 2022-09-22 NOTE — NURSING NOTE
Dermatology Rooming Note    Garry Ferreira's goals for this visit include:   Chief Complaint   Patient presents with     Hair Loss     Garry is here for an ILK follow-up. States condition has remained the same since last seen.     Teto White, EMT

## 2022-09-22 NOTE — LETTER
9/22/2022       RE: Garry Ferreira  [address] [address]     Dear Colleague,    Thank you for referring your patient, Garry Ferreira, to the [facility] DERMATOLOGY CLINIC [place] at [facility]. Please see a copy of my visit note below.    [facility] Dermatology Note  Encounter Date: Sep 22, 2022  Office Visit     Dermatology Problem List:  1.  Alopecia areata. Prior: tofacitinib 5mg BID (continued to progress), augmented betamethasone. S/p prednisone taper starting at 40mg tapered over 6 weeks (initiated 5/2021), increased from 5mg every day to 10mg daily 9/22/22.   - Current tx: Prednisone 10mg (2.5mgX4) daily, Minoxidil 1.25mg (1/2tab) daily, lidex solution daily, IL-K injections q6-8w, ketoconazole shampoo, latisse   ____________________________________________     Assessment & Plan:      # Alopecia Areata. Chronic, active. Steroid responsive with regular IL-K injections, low-dose oral prednisone (5mg daily), and oral minoxidil (1.25mg daily). Previously on xeljanz for ~1 year however not well tolerated (GI bleed). PA for opzelura (ruxolitinib) cream recently denied x2.   - IL-K injections today (see procedure note below)   - Increase prednisone 5mg daily to 10mg daily (now 4 pills of 2.5mg each, per day)  - Continue half-tablet of minoxidil daily (1.25mg per day)   - Continue lidex solution daily   - Continue ketoconazole shampoo 3-4x/week, leave on 5 min prior to rinsing   - Continue latisse for eyebrows as needed       Procedures Performed:   - Intra-lesional triamcinolone procedure note. After positioning and cleansing with isopropyl alcohol, 4 total mL of triamcinolone 10 mg/mL was injected into 14 lesion(s) on the scalp. The patient tolerated the procedure well and left the dermatology clinic in good condition.     Follow-up: 6-8 week(s) in-person, or earlier for new or changing lesions    Staff and  Resident:     Tammy Ospina MD PGY4    Patient was staffed with Dr. Kevin YORK, Tressa Brown MD, saw this patient with the resident and agree with the resident s findings and plan of care as documented in the resident s note.    ____________________________________________    CC: Hair Loss (Garry is here for an ILK follow-up. States condition has remained the same since last seen.)    HPI:  Ms. Garry Ferreira is a(n) 22 year old female who presents today as a return patient for hair loss secondary to alopecia areata. Presents to clinic today with mom alongside who offers supplemental history. She continues on 5mg prednisone and 1.25mg minoxidil since last visit. She is currently experiencing a flare affecting majority of scalp, cannot ascertain specific trigger however seems to be steroid responsive as tyshawn of hair regrowth are seen at areas that were previous injected. Patient is otherwise feeling well, without additional skin concerns.    Labs Reviewed:  N/A    Physical Exam:  SKIN: Focused examination of hair and scalp was performed.   - /75   Pulse 74    - small tyshawn of regrowth seen within wider patches of alopecia across noted most prominently on bilateral parietal and posterior scalp; hair pull test positive today; eyebrows and eyelashes normal density; no other lesions of concern on areas examined.     Medications:  Current Outpatient Medications   Medication     fluocinonide (LIDEX) 0.05 % external solution     minoxidil (LONITEN) 2.5 MG tablet     predniSONE (DELTASONE) 2.5 MG tablet     augmented betamethasone dipropionate (DIPROLENE-AF) 0.05 % external cream     augmented betamethasone dipropionate (DIPROLENE-AF) 0.05 % external ointment     clobetasol (TEMOVATE) 0.05 % external cream     clobetasol propionate (CLOBEX) 0.05 % external shampoo     ketoconazole (NIZORAL) 2 % external shampoo     Ruxolitinib Phosphate (OPZELURA) 1.5 % CREA     tofacitinib (XELJANZ) 5 MG PO tablet      Current Facility-Administered Medications   Medication     triamcinolone acetonide (KENALOG-10) injection 20 mg     triamcinolone acetonide (KENALOG-10) injection 40 mg     triamcinolone acetonide (KENALOG-10) injection 40 mg     triamcinolone acetonide (KENALOG-10) injection 40 mg     triamcinolone acetonide (KENALOG-10) injection 40 mg     triamcinolone acetonide (KENALOG-10) injection 40 mg     triamcinolone acetonide (KENALOG-10) injection 40 mg     triamcinolone acetonide (KENALOG-10) injection 40 mg     triamcinolone acetonide (KENALOG-10) injection 46 mg      Past Medical History:   Patient Active Problem List   Diagnosis     Alopecia areata     Medication monitoring encounter     No past medical history on file.      Again, thank you for allowing me to participate in the care of your patient.      Sincerely,    Dago Ospina MD

## 2022-09-22 NOTE — NURSING NOTE
Drug Administration Record    Prior to injection, verified patient identity using patient's name and date of birth.  Due to injection administration, patient instructed to remain in clinic for 15 minutes  afterwards, and to report any adverse reaction to me immediately.    Drug Name: triamcinolone acetonide(kenalog)  Dose: 4.0mL of triamcinolone 10mg/mL, 40mg dose  Route administered: ID  NDC #: Kenalog-10 (7710-6598-00)  Amount of waste(mL):1.0mL  Reason for waste: Multi dose vial    LOT #: [id number]  SITE: See Provider Note  : [employer]  EXPIRATION DATE: FEB2024

## 2022-09-28 ENCOUNTER — MYC MEDICAL ADVICE (OUTPATIENT)
Dept: DERMATOLOGY | Facility: CLINIC | Age: 22
End: 2022-09-28

## 2022-09-28 DIAGNOSIS — L63.9 ALOPECIA AREATA: Primary | ICD-10-CM

## 2022-10-13 NOTE — TELEPHONE ENCOUNTER
See MyChart encounter from 10/7/2022 for more information. Prescription signed. Closing this encounter.    Teto White, EMT

## 2022-11-03 ENCOUNTER — OFFICE VISIT (OUTPATIENT)
Dept: DERMATOLOGY | Facility: CLINIC | Age: 22
End: 2022-11-03
Payer: COMMERCIAL

## 2022-11-03 DIAGNOSIS — L63.9 ALOPECIA AREATA: Primary | ICD-10-CM

## 2022-11-03 DIAGNOSIS — Z79.899 LONG-TERM USE OF HIGH-RISK MEDICATION: ICD-10-CM

## 2022-11-03 PROCEDURE — 11901 INJECT SKIN LESIONS >7: CPT | Mod: GC | Performed by: DERMATOLOGY

## 2022-11-03 ASSESSMENT — PAIN SCALES - GENERAL: PAINLEVEL: NO PAIN (0)

## 2022-11-03 NOTE — PROGRESS NOTES
[facility] Dermatology Note  Encounter Date: Nov 3, 2022  Office Visit     Dermatology Problem List:  1.  Alopecia areata. Prior: tofacitinib 5mg BID (continued to progress), augmented betamethasone. S/p prednisone taper starting at 40mg tapered over 6 weeks (initiated 5/2021), increased from 5mg every day to 10mg daily 9/22/22.   - Current tx: Prednisone 10mg (2.5mgX4) daily, Minoxidil 1.25mg (1/2tab) daily, lidex solution daily, IL-K injections q6-8w, ketoconazole shampoo, latisse   ____________________________________________     Assessment & Plan:      # Alopecia Areata. Chronic, active. Steroid responsive with regular IL-K injections, low-dose oral prednisone (5mg daily), and oral minoxidil (1.25mg daily). Previously on xeljanz for ~1 year however not well tolerated (GI bleed but question if related to long term prednisone). PA for opzelura (ruxolitinib) cream recently denied x2.   - IL-K injections today (see procedure note below)   - Continue half-tablet of minoxidil daily (1.25mg per day)   - Countinue 10mg prednisone daily (now 4 pills of 2.5mg each, per day)  - Endocrine referral placed for baseline evaluation of bone health with long-term prednisone; discussed calcium/vitamin D supplementation   - Discussed potential transition of prednisone to methotrexate; will continue discussion next visit  - Continue lidex solution daily   - Continue ketoconazole shampoo 3-4x/week, leave on 5 min prior to rinsing   - Continue latisse for eyebrows as needed       Procedures Performed:   - Intra-lesional triamcinolone procedure note. After positioning and cleansing with isopropyl alcohol, 4 total mL of triamcinolone 10 mg/mL was injected into 14 lesion(s) on the scalp. The patient tolerated the procedure well and left the dermatology clinic in good condition.     Follow-up: 6-8 week(s) in-person, or earlier for new or changing lesions    Staff and Resident:     Tammy Ospina MD PGY4    Patient was  staffed with Dr. Busby    I have personally examined this patient and agree with the resident doctor's documentation and plan of care. I have reviewed and amended the resident's note above. The documentation accurately reflects my clinical observations, diagnoses, treatment and follow-up plans. I was present for key portions of the procedure.       Josefa Busby MD  Dermatology Staff    ____________________________________________    CC: Hair Loss (Garry is here for an ILK follow-up. States condition has slightly improved since last seen.)    HPI:  Ms. Garry Ferreira is a(n) 22 year old female who presents today as a return patient for hair loss secondary to alopecia areata. Presents to clinic today with mom alongside who offers supplemental history. She continues on 10mg prednisone (was increased at last visit) and 1.25mg minoxidil since last visit, when she also had 4cc of IL-K 10 injected on the scalp. Since that time, she has seen regrowth on the frontal and parietal scalp. However there is still more room for improvement. Patient is otherwise feeling well, without additional skin concerns.    Labs Reviewed:  N/A    Physical Exam:  SKIN: Focused examination of hair and scalp was performed.   - There were no vitals taken for this visit.   - small tyshawn of regrowth seen within wider patches of alopecia across noted most prominently on bilateral parietal and frontal scalp; hair pull test positive today; eyebrows and eyelashes normal density; no other lesions of concern on areas examined.     Medications:  Current Outpatient Medications   Medication     fluocinonide (LIDEX) 0.05 % external solution     minoxidil (LONITEN) 2.5 MG tablet     NONFORMULARY     predniSONE (DELTASONE) 2.5 MG tablet     augmented betamethasone dipropionate (DIPROLENE-AF) 0.05 % external cream     augmented betamethasone dipropionate (DIPROLENE-AF) 0.05 % external ointment     clobetasol (TEMOVATE) 0.05 % external cream     clobetasol  propionate (CLOBEX) 0.05 % external shampoo     ketoconazole (NIZORAL) 2 % external shampoo     Ruxolitinib Phosphate (OPZELURA) 1.5 % CREA     tofacitinib (XELJANZ) 5 MG PO tablet     Current Facility-Administered Medications   Medication     triamcinolone acetonide (KENALOG-10) injection 20 mg     triamcinolone acetonide (KENALOG-10) injection 40 mg     triamcinolone acetonide (KENALOG-10) injection 40 mg     triamcinolone acetonide (KENALOG-10) injection 40 mg     triamcinolone acetonide (KENALOG-10) injection 40 mg     triamcinolone acetonide (KENALOG-10) injection 40 mg     triamcinolone acetonide (KENALOG-10) injection 40 mg     triamcinolone acetonide (KENALOG-10) injection 40 mg     triamcinolone acetonide (KENALOG-10) injection 40 mg     triamcinolone acetonide (KENALOG-10) injection 46 mg      Past Medical History:   Patient Active Problem List   Diagnosis     Alopecia areata     Medication monitoring encounter     No past medical history on file.

## 2022-11-03 NOTE — LETTER
Date:November 7, 2022      Patient was self referred, no letter generated. Do not send.        [facility] Health Information

## 2022-11-03 NOTE — LETTER
11/3/2022       RE: Garry Ferreira  [address] [address]     Dear Colleague,    Thank you for referring your patient, Garry Ferreira, to the [facility] DERMATOLOGY CLINIC [place] at [facility]. Please see a copy of my visit note below.    [facility] Dermatology Note  Encounter Date: Nov 3, 2022  Office Visit     Dermatology Problem List:  1.  Alopecia areata. Prior: tofacitinib 5mg BID (continued to progress), augmented betamethasone. S/p prednisone taper starting at 40mg tapered over 6 weeks (initiated 5/2021), increased from 5mg every day to 10mg daily 9/22/22.   - Current tx: Prednisone 10mg (2.5mgX4) daily, Minoxidil 1.25mg (1/2tab) daily, lidex solution daily, IL-K injections q6-8w, ketoconazole shampoo, latisse   ____________________________________________     Assessment & Plan:      # Alopecia Areata. Chronic, active. Steroid responsive with regular IL-K injections, low-dose oral prednisone (5mg daily), and oral minoxidil (1.25mg daily). Previously on xeljanz for ~1 year however not well tolerated (GI bleed but question if related to long term prednisone). PA for opzelura (ruxolitinib) cream recently denied x2.   - IL-K injections today (see procedure note below)   - Continue half-tablet of minoxidil daily (1.25mg per day)   - Countinue 10mg prednisone daily (now 4 pills of 2.5mg each, per day)  - Endocrine referral placed for baseline evaluation of bone health with long-term prednisone; discussed calcium/vitamin D supplementation   - Discussed potential transition of prednisone to methotrexate; will continue discussion next visit  - Continue lidex solution daily   - Continue ketoconazole shampoo 3-4x/week, leave on 5 min prior to rinsing   - Continue latisse for eyebrows as needed       Procedures Performed:   - Intra-lesional triamcinolone procedure note. After positioning and cleansing with isopropyl  alcohol, 4 total mL of triamcinolone 10 mg/mL was injected into 14 lesion(s) on the scalp. The patient tolerated the procedure well and left the dermatology clinic in good condition.     Follow-up: 6-8 week(s) in-person, or earlier for new or changing lesions    Staff and Resident:     Tammy Ospina MD PGY4    Patient was staffed with Dr. Busby    I have personally examined this patient and agree with the resident doctor's documentation and plan of care. I have reviewed and amended the resident's note above. The documentation accurately reflects my clinical observations, diagnoses, treatment and follow-up plans. I was present for key portions of the procedure.       Josefa Busby MD  Dermatology Staff    ____________________________________________    CC: Hair Loss (Garry is here for an ILK follow-up. States condition has slightly improved since last seen.)    HPI:  Ms. Garry Ferreira is a(n) 22 year old female who presents today as a return patient for hair loss secondary to alopecia areata. Presents to clinic today with mom alongside who offers supplemental history. She continues on 10mg prednisone (was increased at last visit) and 1.25mg minoxidil since last visit, when she also had 4cc of IL-K 10 injected on the scalp. Since that time, she has seen regrowth on the frontal and parietal scalp. However there is still more room for improvement. Patient is otherwise feeling well, without additional skin concerns.    Labs Reviewed:  N/A    Physical Exam:  SKIN: Focused examination of hair and scalp was performed.   - There were no vitals taken for this visit.   - small tyshawn of regrowth seen within wider patches of alopecia across noted most prominently on bilateral parietal and frontal scalp; hair pull test positive today; eyebrows and eyelashes normal density; no other lesions of concern on areas examined.     Medications:  Current Outpatient Medications   Medication     fluocinonide (LIDEX) 0.05 % external  solution     minoxidil (LONITEN) 2.5 MG tablet     NONFORMULARY     predniSONE (DELTASONE) 2.5 MG tablet     augmented betamethasone dipropionate (DIPROLENE-AF) 0.05 % external cream     augmented betamethasone dipropionate (DIPROLENE-AF) 0.05 % external ointment     clobetasol (TEMOVATE) 0.05 % external cream     clobetasol propionate (CLOBEX) 0.05 % external shampoo     ketoconazole (NIZORAL) 2 % external shampoo     Ruxolitinib Phosphate (OPZELURA) 1.5 % CREA     tofacitinib (XELJANZ) 5 MG PO tablet     Current Facility-Administered Medications   Medication     triamcinolone acetonide (KENALOG-10) injection 20 mg     triamcinolone acetonide (KENALOG-10) injection 40 mg     triamcinolone acetonide (KENALOG-10) injection 40 mg     triamcinolone acetonide (KENALOG-10) injection 40 mg     triamcinolone acetonide (KENALOG-10) injection 40 mg     triamcinolone acetonide (KENALOG-10) injection 40 mg     triamcinolone acetonide (KENALOG-10) injection 40 mg     triamcinolone acetonide (KENALOG-10) injection 40 mg     triamcinolone acetonide (KENALOG-10) injection 40 mg     triamcinolone acetonide (KENALOG-10) injection 46 mg      Past Medical History:   Patient Active Problem List   Diagnosis     Alopecia areata     Medication monitoring encounter     No past medical history on file.      Again, thank you for allowing me to participate in the care of your patient.      Sincerely,    Dago Ospina MD

## 2022-11-03 NOTE — NURSING NOTE
Dermatology Rooming Note    Garry Ferreira's goals for this visit include:   Chief Complaint   Patient presents with     Hair Loss     Garry is here for an ILK follow-up. States condition has slightly improved since last seen.     Teto White, EMT

## 2022-11-03 NOTE — NURSING NOTE
Drug Administration Record    Prior to injection, verified patient identity using patient's name and date of birth.  Due to injection administration, patient instructed to remain in clinic for 15 minutes  afterwards, and to report any adverse reaction to me immediately.    Drug Name: triamcinolone acetonide(kenalog)  Dose: 4.0mL of triamcinolone 10mg/mL, 40mg dose  Route administered: ID  NDC #: Kenalog-10 (5682-8175-87)  Amount of waste(mL):1.0mL  Reason for waste: Multi dose vial    LOT #: [id number]  SITE: See Provider Note  : [employer]  EXPIRATION DATE: APR2024

## 2022-11-07 DIAGNOSIS — L65.9 LOSS OF HAIR: ICD-10-CM

## 2022-11-11 RX ORDER — MINOXIDIL 2.5 MG/1
TABLET ORAL
Qty: 45 TABLET | Refills: 1 | OUTPATIENT
Start: 2022-11-11

## 2022-12-29 ENCOUNTER — OFFICE VISIT (OUTPATIENT)
Dept: DERMATOLOGY | Facility: CLINIC | Age: 22
End: 2022-12-29
Payer: COMMERCIAL

## 2022-12-29 VITALS — DIASTOLIC BLOOD PRESSURE: 73 MMHG | SYSTOLIC BLOOD PRESSURE: 119 MMHG

## 2022-12-29 DIAGNOSIS — L63.9 ALOPECIA AREATA: Primary | ICD-10-CM

## 2022-12-29 PROCEDURE — 99213 OFFICE O/P EST LOW 20 MIN: CPT | Mod: 25 | Performed by: DERMATOLOGY

## 2022-12-29 PROCEDURE — 11901 INJECT SKIN LESIONS >7: CPT | Mod: GC | Performed by: DERMATOLOGY

## 2022-12-29 ASSESSMENT — PAIN SCALES - GENERAL: PAINLEVEL: NO PAIN (0)

## 2022-12-29 NOTE — LETTER
12/29/2022       RE: Garry Ferreira  [address] [address]     Dear Colleague,    Thank you for referring your patient, Garry Ferreira, to the [facility] DERMATOLOGY CLINIC [place] at [facility]. Please see a copy of my visit note below.    [facility] Dermatology Note  Encounter Date: Dec 29, 2022  Office Visit     Dermatology Problem List:  1.  Alopecia areata. Prior: tofacitinib 5mg BID (continued to progress), augmented betamethasone. S/p prednisone taper starting at 40mg tapered over 6 weeks (initiated 5/2021), increased from 5mg every day to 10mg daily 9/22/22.   - Current tx: Prednisone 10mg (2.5mgX4) daily, Minoxidil 1.25mg (1/2tab) daily, lidex solution daily, IL-K injections q6-8w, ketoconazole shampoo, latisse   ____________________________________________     Assessment & Plan:      # Alopecia Areata. Chronic. Not at goal. Steroid responsive with regular IL-K injections, low-dose oral prednisone (currently 10mg alternating with 5mg daily), and oral minoxidil (increased to 2.5mg daily at last visit). Previously on xeljanz for ~1 year however not well tolerated (GI bleed but question if related to long term prednisone). PA for opzelura (ruxolitinib) cream recently denied x2.   - IL-K injections today (see procedure note below)   - Increase minoxidil to 2.5mg (1 full tablet) daily    - Decrease prednisone as follows:    10mg (4 pills) one day, alternating with 5mg (2 pills) next day, repeat   - Continue calcium / vitamin D supplements   - Endocrine referral placed for baseline evaluation of bone health with long-term prednisone  - May consider second-gen ESTELLE inhibitors in the future   - Continue lidex solution daily   - Continue ketoconazole shampoo 3-4x/week, leave on 5 min prior to rinsing   - Continue latisse for eyebrows as needed       Procedures Performed:   - Intra-lesional triamcinolone procedure  note. After positioning and cleansing with isopropyl alcohol, 4 total mL of triamcinolone 10 mg/mL was injected into 14 lesion(s) on the scalp. The patient tolerated the procedure well and left the dermatology clinic in good condition.     Follow-up: 6-8 week(s) in-person, or earlier for new or changing lesions    Staff and Resident:     Tammy Ospina MD PGY4    Patient was staffed with Dr. Melvin    Patient was seen and examined with the dermatology resident. I agree with the history, review of systems, physical examination, assessments and plan. I was present for the key portions of the ILK procedure.    Sandhya Melvin MD  Professor and  Chair  Department of Dermatology  [facility]    ____________________________________________    CC: Hair Loss (Garry is here for an Alopecia follow-up. States condition has remained the same since last seen.)    HPI:  Ms. Garry Ferreira is a(n) 22 year old female who presents today as a return patient for hair loss secondary to alopecia areata. Presents to clinic today with grandma lynn who offers supplemental history. She continues on 10mg prednisone and 1.25mg minoxidil since last visit, when she also had 4cc of IL-K 10 injected on the scalp. Since that time, she has seen stable regrowth on the frontal and parietal scalp. However there is still more room for improvement. Patient is otherwise feeling well, without additional skin concerns.    Labs Reviewed:  N/A    Physical Exam:  SKIN: Focused examination of hair, face, hands  and scalp was performed.   - /73    - tyshawn of regrowth seen on scalp noted most prominently on frontal, vertex, and parietal scalp; hair pull test negative today; eyebrows and eyelashes normal density; no other lesions of concern on areas examined.     Medications:  Current Outpatient Medications   Medication     fluocinonide (LIDEX) 0.05 % external solution     minoxidil (LONITEN) 2.5 MG tablet     NONFORMULARY      predniSONE (DELTASONE) 2.5 MG tablet     augmented betamethasone dipropionate (DIPROLENE-AF) 0.05 % external cream     augmented betamethasone dipropionate (DIPROLENE-AF) 0.05 % external ointment     clobetasol (TEMOVATE) 0.05 % external cream     clobetasol propionate (CLOBEX) 0.05 % external shampoo     ketoconazole (NIZORAL) 2 % external shampoo     Ruxolitinib Phosphate (OPZELURA) 1.5 % CREA     tofacitinib (XELJANZ) 5 MG PO tablet     Current Facility-Administered Medications   Medication     triamcinolone acetonide (KENALOG-10) injection 20 mg     triamcinolone acetonide (KENALOG-10) injection 40 mg     triamcinolone acetonide (KENALOG-10) injection 40 mg     triamcinolone acetonide (KENALOG-10) injection 40 mg     triamcinolone acetonide (KENALOG-10) injection 40 mg     triamcinolone acetonide (KENALOG-10) injection 40 mg     triamcinolone acetonide (KENALOG-10) injection 40 mg     triamcinolone acetonide (KENALOG-10) injection 40 mg     triamcinolone acetonide (KENALOG-10) injection 46 mg      Past Medical History:   Patient Active Problem List   Diagnosis     Alopecia areata     Medication monitoring encounter     No past medical history on file.      Again, thank you for allowing me to participate in the care of your patient.      Sincerely,    Dago Ospina MD

## 2022-12-29 NOTE — PROGRESS NOTES
[facility] Dermatology Note  Encounter Date: Dec 29, 2022  Office Visit     Dermatology Problem List:  1.  Alopecia areata. Prior: tofacitinib 5mg BID (continued to progress), augmented betamethasone. S/p prednisone taper starting at 40mg tapered over 6 weeks (initiated 5/2021), increased from 5mg every day to 10mg daily 9/22/22.   - Current tx: Prednisone 10mg (2.5mgX4) daily, Minoxidil 1.25mg (1/2tab) daily, lidex solution daily, IL-K injections q6-8w, ketoconazole shampoo, latisse   ____________________________________________     Assessment & Plan:      # Alopecia Areata. Chronic. Not at goal. Steroid responsive with regular IL-K injections, low-dose oral prednisone (currently 10mg alternating with 5mg daily), and oral minoxidil (increased to 2.5mg daily at last visit). Previously on xeljanz for ~1 year however not well tolerated (GI bleed but question if related to long term prednisone). PA for opzelura (ruxolitinib) cream recently denied x2.   - IL-K injections today (see procedure note below)   - Increase minoxidil to 2.5mg (1 full tablet) daily    - Decrease prednisone as follows:    10mg (4 pills) one day, alternating with 5mg (2 pills) next day, repeat   - Continue calcium / vitamin D supplements   - Endocrine referral placed for baseline evaluation of bone health with long-term prednisone  - May consider second-gen ESTELLE inhibitors in the future   - Continue lidex solution daily   - Continue ketoconazole shampoo 3-4x/week, leave on 5 min prior to rinsing   - Continue latisse for eyebrows as needed       Procedures Performed:   - Intra-lesional triamcinolone procedure note. After positioning and cleansing with isopropyl alcohol, 4 total mL of triamcinolone 10 mg/mL was injected into 14 lesion(s) on the scalp. The patient tolerated the procedure well and left the dermatology clinic in good condition.     Follow-up: 6-8 week(s) in-person, or earlier for new or changing lesions    Staff and  Resident:     Tammy Ospina MD PGY4    Patient was staffed with Dr. Melvin    Patient was seen and examined with the dermatology resident. I agree with the history, review of systems, physical examination, assessments and plan. I was present for the key portions of the ILK procedure.    Sandhya Melvin MD  Professor and  Chair  Department of Dermatology  [facility]    ____________________________________________    CC: Hair Loss (Garry is here for an Alopecia follow-up. States condition has remained the same since last seen.)    HPI:  Ms. Garry Ferreira is a(n) 22 year old female who presents today as a return patient for hair loss secondary to alopecia areata. Presents to clinic today with grandma alongside who offers supplemental history. She continues on 10mg prednisone and 1.25mg minoxidil since last visit, when she also had 4cc of IL-K 10 injected on the scalp. Since that time, she has seen stable regrowth on the frontal and parietal scalp. However there is still more room for improvement. Patient is otherwise feeling well, without additional skin concerns.    Labs Reviewed:  N/A    Physical Exam:  SKIN: Focused examination of hair, face, hands  and scalp was performed.   - /73    - tyshawn of regrowth seen on scalp noted most prominently on frontal, vertex, and parietal scalp; hair pull test negative today; eyebrows and eyelashes normal density; no other lesions of concern on areas examined.     Medications:  Current Outpatient Medications   Medication     fluocinonide (LIDEX) 0.05 % external solution     minoxidil (LONITEN) 2.5 MG tablet     NONFORMULARY     predniSONE (DELTASONE) 2.5 MG tablet     augmented betamethasone dipropionate (DIPROLENE-AF) 0.05 % external cream     augmented betamethasone dipropionate (DIPROLENE-AF) 0.05 % external ointment     clobetasol (TEMOVATE) 0.05 % external cream     clobetasol propionate (CLOBEX) 0.05 % external shampoo     ketoconazole (NIZORAL) 2 %  external shampoo     Ruxolitinib Phosphate (OPZELURA) 1.5 % CREA     tofacitinib (XELJANZ) 5 MG PO tablet     Current Facility-Administered Medications   Medication     triamcinolone acetonide (KENALOG-10) injection 20 mg     triamcinolone acetonide (KENALOG-10) injection 40 mg     triamcinolone acetonide (KENALOG-10) injection 40 mg     triamcinolone acetonide (KENALOG-10) injection 40 mg     triamcinolone acetonide (KENALOG-10) injection 40 mg     triamcinolone acetonide (KENALOG-10) injection 40 mg     triamcinolone acetonide (KENALOG-10) injection 40 mg     triamcinolone acetonide (KENALOG-10) injection 40 mg     triamcinolone acetonide (KENALOG-10) injection 46 mg      Past Medical History:   Patient Active Problem List   Diagnosis     Alopecia areata     Medication monitoring encounter     No past medical history on file.

## 2022-12-29 NOTE — NURSING NOTE
Drug Administration Record    Prior to injection, verified patient identity using patient's name and date of birth.  Due to injection administration, patient instructed to remain in clinic for 15 minutes  afterwards, and to report any adverse reaction to me immediately.    Drug Name: triamcinolone acetonide(kenalog)  Dose: 4.0mL of triamcinolone 10mg/mL, 40mg dose  Route administered: ID  NDC #: Kenalog-10 (0698-1132-78)  Amount of waste(mL):1.0mL  Reason for waste: Multi dose vial    LOT #: [id number]  SITE: See Provider Note  : [email or web address]  EXPIRATION DATE: JUN2024

## 2022-12-29 NOTE — PATIENT INSTRUCTIONS
Decrease prednisone as follows   - 10mg (4 pills) one day, alternating with 5mg (2 pills) next day, repeat     Increase minoxidil as follows  - 2.5mg (1 full tablet) per day     Continue calcium / vitamin D supplements

## 2022-12-29 NOTE — LETTER
Date:January 9, 2023      Provider requested that no letter be sent. Do not send.       [facility]

## 2023-01-11 NOTE — PROGRESS NOTES
Garry Ferreira is a 22 year old female who is being evaluated via a billable video visit.        Endocrinology [facility] Clinic    Consulting provider: Josefa Busby MD  [address] [place] [address]    Reason for consultation: Alopecia areata    HPI:   Garry Ferreira is a 22 year old female with alopecia areata and long term use of Prednisone 2.5 mg 2 tabs rory m.   Currently on 10 alternating with 5 every other day  Has beenon Prednisone 50% of the time  doasge 5-10 mg daily  Bruisnig easy, not changed, not sports, no muscle weakness    No fractures    Dairy intolerant, intolerant cows milk, drinkg almond or oat milk, eats green , legumes, whole wheat    Does not smoke    Etoh- rare    On OCP estradiol 35 mcg and progesterone  11-12 y menarche    No FH of osteoporosis    Current Problem List:   Patient Active Problem List   Diagnosis     Alopecia areata     Medication monitoring encounter         Assessment:  Garry is a very pleasant young woman with very longstanding alopecia areata with recent worsening now on prednisone treatment.  Patient had been on prednisone treatment 5 to 10 mg about half of the time for the last 2 years also.  In regards to bone health she does not have any additional risk factors including no family history, calcium intake and appears good, she does not smoke does not drink alcohol, her birth control pills provide sufficient amount of estrogen.  She otherwise is quite healthy, no prior history.  Vitamin D D levels in the past are.  Serum calcium in the past has been  Borderline low, therefore we will check for gluten intolerance.   Thyroid function has been checked before and it was normal.  Refraining from obtaining DEXA scan today as per guidelines not indicated as she lacks other comorbidities.  I do recommend to continue vitamin D supplements.    Plan  Lab today Celiac, Calicum creatinine albumin      The  Following reports and notes were  reviewed  Lilia Rowley MD  Endocrinology and Diabetes  Telephone contact:  [facility] [place] [phone]  [facility] [place] [phone]        Past Medical and Past Surgical History:  No past medical history on file.    No past surgical history on file.    Medications:   Current Outpatient Medications   Medication Sig Dispense Refill     augmented betamethasone dipropionate (DIPROLENE-AF) 0.05 % external cream Apply topically 2 times daily To affected areas on scalp (Patient not taking: Reported on 5/19/2022) 50 g 4     augmented betamethasone dipropionate (DIPROLENE-AF) 0.05 % external ointment Apply topically 2 times daily Apply to areas of hair loss on the scalp (Patient not taking: Reported on 5/19/2022) 50 g 4     clobetasol (TEMOVATE) 0.05 % external cream Apply topically 2 times daily To new patches in addition to the surrounding half-inch of hair (Patient not taking: Reported on 5/19/2022) 60 g 1     clobetasol propionate (CLOBEX) 0.05 % external shampoo Apply to a dry scalp, leave on for 10 minutes, then lather and rinse, do 5 times per week (Patient not taking: Reported on 5/19/2022) 118 mL 6     fluocinonide (LIDEX) 0.05 % external solution Apply to affected areas of scalp daily 60 mL 5     ketoconazole (NIZORAL) 2 % external shampoo Apply topically three times a week (Patient not taking: Reported on 5/19/2022) 120 mL 11     minoxidil (LONITEN) 2.5 MG tablet Take half-tablet by mouth daily 15 tablet 4     NONFORMULARY Please dispense one scalp prosthesis for every day use on scalp 1 each 11     predniSONE (DELTASONE) 2.5 MG tablet Take 4 tablets (10 mg) by mouth daily 60 tablet 3     Ruxolitinib Phosphate (OPZELURA) 1.5 % CREA Externally apply 1 Application topically 2 times daily Apply thin layer topically to affected areas of scalp 2 times daily. (Patient not taking: Reported on 9/22/2022) 60 g 3     tofacitinib (XELJANZ) 5 MG PO tablet Take 1 tablet (5  mg) by mouth 2 times daily Please keep appt on 12/3/19. You need labs prior to appt 180 tablet 3       Allergies:   No Known Allergies    Social History     Tobacco Use     Smoking status: Never     Smokeless tobacco: Never   Substance Use Topics     Alcohol use: Not on file       Family History   Problem Relation Age of Onset     Melanoma No family hx of      Skin Cancer No family hx of        Review of Systems:  12 point review of system was negative except for above mentioned    Physical Examination:  General: Well appearing young woman in no distress, no moon facies, no facial plethora  Skin significant hair loss in large areas of her skull, pt is wearing a wig  Eyes: EOMI. Sclerae and conjunctivae are clear.   HENT: No thyromegaly or mass.    Lymphatic: No cervical or supraclavicular lymphadenopathy.  Gastrointestinal: Abdomen is soft, rounded   Skin: No rash or lesions. No abdominal striae. No supraclavicular fat pads, no dorsocervical fat pad, no vitiligo  Extremities: No peripheral edema.   Neurologic: No tremor with hands outstretched. 2+ patellar reflexes.       Labs and Studies:   Lab Results   Component Value Date     03/11/2021    CO2 26 03/11/2021    CHLORIDE 107 03/11/2021    CR 0.57 03/11/2021    TRIG 52 03/11/2021    HDL 88 03/11/2021    HGB 12.6 03/11/2021     Recent Labs   Lab Test 03/11/21  1109 10/09/18  1742   FT3  --  3.9   TSH 1.10  --      Lab Results   Component Value Date    JEOVANY 8.6 03/11/2021    JEOVANY 8.4 (L) 12/17/2020    JEOVANY 8.1 (L) 08/03/2020    JEOVANY 8.8 02/18/2020    ALBUMIN 3.8 03/11/2021    ALT 26 03/11/2021    AST 14 03/11/2021    BILITOTAL 0.2 03/11/2021    CR 0.57 03/11/2021    CR 0.59 12/17/2020    CR 0.71 08/03/2020     03/11/2021    TSH 1.10 03/11/2021    ALKPHOS 66 03/11/2021    HGB 12.6 03/11/2021                           Answers for HPI/ROS submitted by the patient on 1/6/2023  General Symptoms: No  Skin Symptoms: No  HENT Symptoms: No  EYE SYMPTOMS: No  HEART  SYMPTOMS: No  LUNG SYMPTOMS: No  INTESTINAL SYMPTOMS: No  URINARY SYMPTOMS: No  GYNECOLOGIC SYMPTOMS: No  BREAST SYMPTOMS: No  SKELETAL SYMPTOMS: No  BLOOD SYMPTOMS: No  NERVOUS SYSTEM SYMPTOMS: No  MENTAL HEALTH SYMPTOMS: No

## 2023-01-13 ENCOUNTER — OFFICE VISIT (OUTPATIENT)
Dept: ENDOCRINOLOGY | Facility: CLINIC | Age: 23
End: 2023-01-13
Attending: DERMATOLOGY
Payer: COMMERCIAL

## 2023-01-13 VITALS
WEIGHT: 201.3 LBS | HEIGHT: 69 IN | BODY MASS INDEX: 29.82 KG/M2 | DIASTOLIC BLOOD PRESSURE: 83 MMHG | SYSTOLIC BLOOD PRESSURE: 133 MMHG | HEART RATE: 78 BPM

## 2023-01-13 DIAGNOSIS — Z79.899 LONG-TERM USE OF HIGH-RISK MEDICATION: ICD-10-CM

## 2023-01-13 DIAGNOSIS — L63.9 ALOPECIA AREATA: ICD-10-CM

## 2023-01-13 LAB
ALBUMIN SERPL-MCNC: 3.7 G/DL (ref 3.4–5)
CALCIUM SERPL-MCNC: 9.1 MG/DL (ref 8.5–10.1)
CREAT SERPL-MCNC: 0.62 MG/DL (ref 0.52–1.04)
GFR SERPL CREATININE-BSD FRML MDRD: >90 ML/MIN/1.73M2
TSH SERPL DL<=0.005 MIU/L-ACNC: 0.93 MU/L (ref 0.4–4)

## 2023-01-13 PROCEDURE — 82565 ASSAY OF CREATININE: CPT | Performed by: INTERNAL MEDICINE

## 2023-01-13 PROCEDURE — 84443 ASSAY THYROID STIM HORMONE: CPT | Performed by: INTERNAL MEDICINE

## 2023-01-13 PROCEDURE — 86258 DGP ANTIBODY EACH IG CLASS: CPT | Performed by: INTERNAL MEDICINE

## 2023-01-13 PROCEDURE — 82310 ASSAY OF CALCIUM: CPT | Performed by: INTERNAL MEDICINE

## 2023-01-13 PROCEDURE — 99204 OFFICE O/P NEW MOD 45 MIN: CPT | Performed by: INTERNAL MEDICINE

## 2023-01-13 PROCEDURE — 82784 ASSAY IGA/IGD/IGG/IGM EACH: CPT | Performed by: INTERNAL MEDICINE

## 2023-01-13 PROCEDURE — 36415 COLL VENOUS BLD VENIPUNCTURE: CPT | Performed by: INTERNAL MEDICINE

## 2023-01-13 PROCEDURE — 82040 ASSAY OF SERUM ALBUMIN: CPT | Performed by: INTERNAL MEDICINE

## 2023-01-13 RX ORDER — NORGESTIMATE AND ETHINYL ESTRADIOL 0.25-0.035
1 KIT ORAL DAILY
COMMUNITY
Start: 2022-04-11

## 2023-01-13 NOTE — PATIENT INSTRUCTIONS
Do lab today      Top 15 Calcium-Rich Foods (Many Are Nondairy)  Calcium is not only the most abundant mineral in the body but also very important for your health.  In fact, it makes up much of your bones and teeth and plays a role in heart health, muscle function, and nerve signaling (1Trusted Source).  For most adults, it's recommended to consume at least 1,000 mg of calcium per day, though certain groups require a higher amount, including adolescents, postmenopausal women, and older adults (2Trusted Source).  Although dairy products like milk, cheese, and yogurt are especially high in calcium, many dairy-free sources of calcium are available.  Here are 15 foods that are rich in calcium, many of which are nondairy.  Share on Pinterest  1. Seeds  Seeds are tiny nutritional powerhouses, and many are high in calcium, including poppy, sesame, celery, and deja seeds.  For instance, 1 tablespoon (9 grams) of poppy seeds packs 127 mg of calcium, or 10% of the recommended Daily Value (DV) (3Trusted Source).  Seeds also deliver protein and healthy fats. For example, deja seeds are rich in plant-based omega-3 fatty acids (4Trusted Source).  Sesame seeds contain 7% of the DV for calcium in 1 tablespoon (9 grams), plus other minerals, including copper, iron, and manganese (5Trusted Source).  SUMMARY  Many seeds are good sources of calcium and also deliver other important nutrients, such as protein and healthy fats. One tablespoon (9 grams) of poppy seeds contains 10% of the DV for calcium, while a serving of sesame seeds has 7% of the DV.  2. Cheese  Most cheeses are excellent sources of calcium. Parmesan cheese has the most, with 242 mg -- or 19% of the DV -- per ounce (28 grams) (6Trusted Source).  Softer cheeses tend to have less. For instance, 1 ounce (28 grams) of Brie only delivers 52 mg, or 4% of the DV (7Trusted Source).  As a bonus, your body absorbs the calcium in dairy products more easily than that from plant  sources (8Trusted Source).  Cheese also delivers protein. Cottage cheese has 23 grams of protein per cup (9Trusted Source).  What's more, aged, hard cheeses are naturally low in lactose, making them easier to digest for people with lactose intolerance (10Trusted Source).  Dairy may have additional health benefits. For example, one review of 31 studies suggests that increased dairy intake may be associated with a lower risk of heart disease (11Trusted Source).  Another review found that the regular consumption of milk and yogurt was linked to a lower risk of metabolic syndrome, a condition that raises your risk of heart disease, stroke, and type 2 diabetes (12Trusted Source).  However, keep in mind that full fat cheese can be high in saturated fat and calories. Certain cheeses also contain a lot of sodium, which some people may need to limit.  SUMMARY  Parmesan cheese packs 19% of the DV for calcium, while other types like Brie deliver around 4%. Despite being high in saturated fat and calories, eating dairy may lower your risk of heart disease.  3. Yogurt  Yogurt is an excellent source of calcium.  Many types of yogurt are also rich in probiotics, a type of beneficial bacteria that can promote immune function, improve heart health, and enhance nutrient absorption (13Trusted Source).  One cup (245 grams) of plain yogurt contains 23% of the DV for calcium, as well as a hearty dose of phosphorus, potassium, and vitamins B2 and B12 (14Trusted Source).  Low fat yogurt may be even higher in calcium, with 34% of the DV in 1 cup (245 grams) (15Trusted Source).  On the other hand, while Greek yogurt is a great way to get extra protein in your diet, it delivers less calcium than regular yogurt (16Trusted Source).  In addition to providing a wide array of nutrients, some research also shows that regular consumption of yogurt may be linked to a lower risk of developing heart disease and type 2 diabetes (17Trusted Source,  18Trusted Source).  4. Sardines and canned salmon  Sardines and canned salmon are loaded with calcium, thanks to their edible bones.  A 3.75-ounce (92-gram) can of sardines packs 27% of the DV, and 3 ounces (85 grams) of canned salmon with bones has 19% (19Trusted Source, 20Trusted Source).  These oily fish also provide high quality protein and omega-3 fatty acids, which can support the health of your heart, brain, and skin (21Trusted Source, 22Trusted Source).  While seafood may contain mercury, smaller fish such as sardines have low levels. In addition, both sardines and salmon have high levels of selenium, a mineral that can prevent and reverse mercury toxicity (23Trusted Source).  SUMMARY  Sardines and canned salmon are exceptionally nutritious choices. A can of sardines gives you 27% of the DV for calcium, while 3 ounces (85 grams) of canned salmon packs 19%.  5. Beans and lentils  Beans and lentils are high in fiber, protein, and micronutrients, including iron, zinc, folate, magnesium, and potassium.  Some varieties also have decent amounts of calcium, including winged beans, which supply 244 mg, or 19% of the DV, in a single cooked cup (172 grams) (24Trusted Source).  White beans are also a good source, with 1 cup (179 grams) of cooked white beans providing 12% of the DV. Other varieties of beans and lentils have less, ranging from around 3-4% of the DV per cup (175 grams) (25Trusted Source, 26Trusted Source, 27Trusted Source).  Interestingly, beans are credited with many of the health benefits associated with plant-based diets. In fact, research suggests that beans may help lower LDL (bad) cholesterol levels and reduce your risk of developing type 2 diabetes (28Trusted Source).  SUMMARY  Beans are highly nutritious. One cup (172 grams) of cooked wing beans delivers 19% of the DV for calcium, while other varieties provide around 3-12% for the same serving size.  6. Almonds  Of all nuts, almonds are among the  highest in calcium. Just 1 ounce (28 grams) of almonds, or about 23 nuts, delivers 6% of the DV (29Trusted Source).  Almonds also provide 3.5 grams of fiber per ounce (28 grams), as well as healthy fats and protein. In addition, they're an excellent source of magnesium, manganese, and vitamin E.  Eating nuts may also help lower blood pressure, body fat, and multiple other risk factors for metabolic disease (30Trusted Source).  SUMMARY  Almonds are high in nutrients like healthy fats, protein, and magnesium. One ounce (28 grams) of almonds, or 23 nuts, delivers 6% of the DV for calcium.   Whey protein  Whey is a type of protein found in milk that has been well studied for its potential health benefits (31Trusted Source).  It's also an excellent protein source and full of rapidly digested amino acids, which help promote muscle growth and recovery (32Trusted Source).  Interestingly, some studies have even linked whey-rich diets to increased weight loss and improved blood sugar management (33Trusted Source).  Whey is also exceptionally rich in calcium -- a 1.2-ounce (33-gram) scoop of whey protein powder isolate contains approximately 160 mg, or 12% of the DV (34Trusted Source).  8. Leafy greens  Leafy green vegetables are incredibly healthy, and many of them are high in calcium, including rizwana greens, spinach, and kale.  For instance, 1 cup (190 grams) of cooked rizwana greens has 268 mg of calcium, or about 21% of the amount that you need in a day (35Trusted Source).  Note that some varieties, such as spinach, are high in oxalates, which are naturally occurring compounds that bind to calcium and impair its absorption (36Trusted Source).  Therefore, although spinach is rich in calcium, it's not absorbed as well as other calcium-rich greens that are low in oxalates, such as kale and rizwana greens.  SUMMARY  Some leafy greens are rich in calcium, including rizwana greens, which contain 21% of the DV in each cooked  cup (190 grams). However, certain leafy greens contain oxalates, which can decrease the absorption of calcium.  9. Rhubarb  Rhubarb is rich in fiber, vitamin K, calcium, and smaller amounts of other vitamins and minerals.  It also contains prebiotic fiber, a type of fiber that can promote the growth of healthy bacteria in your gut (37Trusted Source).  Like spinach, rhubarb is high in oxalates, so much of the calcium is not absorbed. In fact, one study found that your body can only absorb around 5% of the calcium found in rhubarb (38).  On the other hand, even if you're only absorbing a small amount, rhubarb is still a source of calcium, with 105 mg of calcium per cup (122 grams) of raw rhubarb, or about 8% of the DV (39Trusted Source).  SUMMARY  Rhubarb is high in fiber, vitamin K, and other nutrients. It also contains calcium, although only a small amount is absorbed by the body.  10. Fortified foods  Fortified foods like cereals can make it easier to meet your daily calcium needs.  In fact, some types of cereal can deliver up to 1,000 mg (100% of the DV) per serving -- and that's before adding milk (40Trusted Source).  However, keep in mind that your body can't absorb all that calcium at once, and it's best to spread your intake throughout the day.  Flour and cornmeal may also be fortified with calcium. This is why some breads, tortillas, and crackers contain high amounts (41Trusted SourceTrusted Source, 42Trusted Source).  SUMMARY  Grain-based foods are often fortified with calcium, including some breakfast cereals, tortillas, breads, and crackers.  11. Amaranth  Amaranth is a highly nutritious pseudocereal.  It's a good source of folate and very high in certain minerals, including manganese, magnesium, phosphorus, and iron.  One cup (246 grams) of cooked amaranth grain delivers 116 mg of calcium, or 9% of the DV (43Trusted Source).  Amaranth leaves contain even more, with 21% of the DV for calcium per cooked cup  (132 grams), along with a good amount of vitamins A and C (44Trusted Source).  SUMMARY  The seeds and leaves of amaranth are very nutritious. One cup (246 grams) of cooked amaranth provides 9% of the DV for calcium, while the leaves pack 21% per cup (132 grams).  12. Edamame and tofu  Edamame beans are young soybeans, often sold while still encased in the pod.  One cup (155 grams) of cooked edamame packs 8% of the DV for calcium. It's also a good source of protein and delivers all of your daily folate in a single serving (45Trusted Source).  Tofu that has been prepared with calcium also has exceptionally high amounts, with over 66% of the DV for calcium in just half a cup (126 grams) (46Trusted Source).  SUMMARY  Tofu and edamame are both rich in calcium. Just half a cup (126 grams) of tofu prepared with calcium has 66% of the DV, while 1 cup (155 grams) of cooked edamame packs 8%.  13. Fortified drinks  Even if you don't drink milk, you can still get calcium from many fortified, nondairy beverages.  One cup (237 mL) of fortified soy milk has 23% of the DV.  What's more, its 6 grams of protein make it the nondairy milk that's most nutritionally similar to cow's milk (47Trusted Source).  Other types of nut- and seed-based milks may be fortified with even higher levels.  However, fortification isn't just for nondairy milks. For instance, orange juice can also be fortified, providing as much as 27% of the DV per cup (237 mL) (48Trusted Source).  SUMMARY  Nondairy milks and orange juice may be fortified with calcium. For example, 1 cup (237 mL) of fortified orange juice can have 27% of the DV, while the same serving of fortified soy milk packs 23%.  14. Figs  Dried figs are rich in antioxidants and fiber.  They also have more calcium than other dried fruits. In fact, dried figs provide 5% of the DV for calcium in a 1.4-ounce (40-gram) serving (49Trusted Source).  Moreover, figs provide a good amount of potassium and  vitamin K, two micronutrients that are essential for bone health (50Trusted Source, 51Trusted Source).  SUMMARY  Dried figs contain more calcium than other dried fruits. A 1.4-ounce (40-gram) serving has 5% of your daily needs for this mineral.

## 2023-01-13 NOTE — LETTER
1/13/2023         RE: Garry Ferreira  [address] [facility] [address]        Dear Colleague,    Thank you for referring your patient, Garry Ferreira, to the [facility]. Please see a copy of my visit note below.      Garry Ferreira is a 22 year old female who is being evaluated via a billable video visit.        [facility]    Consulting provider: Josefa Busby MD  [address] [address]    Reason for consultation: Alopecia areata    HPI:   Garry Ferreira is a 22 year old female with alopecia areata and long term use of Prednisone 2.5 mg 2 tabs rory m.   Currently on 10 alternating with 5 every other day  Has beenon Prednisone 50% of the time  doasge 5-10 mg daily  Bruisnig easy, not changed, not sports, no muscle weakness    No fractures    Dairy intolerant, intolerant cows milk, drinkg almond or oat milk, eats green , legumes, whole wheat    Does not smoke    Etoh- rare    On OCP estradiol 35 mcg and progesterone  11-12 y menarche    No FH of osteoporosis    Current Problem List:   Patient Active Problem List   Diagnosis     Alopecia areata     Medication monitoring encounter         Assessment:  Garry is a very pleasant young woman with very longstanding alopecia areata with recent worsening now on prednisone treatment.  Patient had been on prednisone treatment 5 to 10 mg about half of the time for the last 2 years also.  In regards to bone health she does not have any additional risk factors including no family history, calcium intake and appears good, she does not smoke does not drink alcohol, her birth control pills provide sufficient amount of estrogen.  She otherwise is quite healthy, no prior history.  Vitamin D D levels in the past are.  Serum calcium in the past has been  Borderline low, therefore we will check for gluten intolerance.   Thyroid function has been checked before and it was normal.  Refraining  from obtaining DEXA scan today as per guidelines not indicated as she lacks other comorbidities.  I do recommend to continue vitamin D supplements.    Plan  Lab today Celiac, Calicum creatinine albumin      The  Following reports and notes were reviewed  Lilia Rowley MD  Endocrinology and Diabetes  Telephone contact:  [facility] [place] [phone]  [facility] [place] [phone]        Past Medical and Past Surgical History:  No past medical history on file.    No past surgical history on file.    Medications:   Current Outpatient Medications   Medication Sig Dispense Refill     augmented betamethasone dipropionate (DIPROLENE-AF) 0.05 % external cream Apply topically 2 times daily To affected areas on scalp (Patient not taking: Reported on 5/19/2022) 50 g 4     augmented betamethasone dipropionate (DIPROLENE-AF) 0.05 % external ointment Apply topically 2 times daily Apply to areas of hair loss on the scalp (Patient not taking: Reported on 5/19/2022) 50 g 4     clobetasol (TEMOVATE) 0.05 % external cream Apply topically 2 times daily To new patches in addition to the surrounding half-inch of hair (Patient not taking: Reported on 5/19/2022) 60 g 1     clobetasol propionate (CLOBEX) 0.05 % external shampoo Apply to a dry scalp, leave on for 10 minutes, then lather and rinse, do 5 times per week (Patient not taking: Reported on 5/19/2022) 118 mL 6     fluocinonide (LIDEX) 0.05 % external solution Apply to affected areas of scalp daily 60 mL 5     ketoconazole (NIZORAL) 2 % external shampoo Apply topically three times a week (Patient not taking: Reported on 5/19/2022) 120 mL 11     minoxidil (LONITEN) 2.5 MG tablet Take half-tablet by mouth daily 15 tablet 4     NONFORMULARY Please dispense one scalp prosthesis for every day use on scalp 1 each 11     predniSONE (DELTASONE) 2.5 MG tablet Take 4 tablets (10 mg) by mouth daily 60 tablet 3     Ruxolitinib Phosphate (OPZELURA)  1.5 % CREA Externally apply 1 Application topically 2 times daily Apply thin layer topically to affected areas of scalp 2 times daily. (Patient not taking: Reported on 9/22/2022) 60 g 3     tofacitinib (XELJANZ) 5 MG PO tablet Take 1 tablet (5 mg) by mouth 2 times daily Please keep appt on 12/3/19. You need labs prior to appt 180 tablet 3       Allergies:   No Known Allergies    Social History     Tobacco Use     Smoking status: Never     Smokeless tobacco: Never   Substance Use Topics     Alcohol use: Not on file       Family History   Problem Relation Age of Onset     Melanoma No family hx of      Skin Cancer No family hx of        Review of Systems:  12 point review of system was negative except for above mentioned    Physical Examination:  General: Well appearing young woman in no distress, no moon facies, no facial plethora  Skin significant hair loss in large areas of her skull, pt is wearing a wig  Eyes: EOMI. Sclerae and conjunctivae are clear.   HENT: No thyromegaly or mass.    Lymphatic: No cervical or supraclavicular lymphadenopathy.  Gastrointestinal: Abdomen is soft, rounded   Skin: No rash or lesions. No abdominal striae. No supraclavicular fat pads, no dorsocervical fat pad, no vitiligo  Extremities: No peripheral edema.   Neurologic: No tremor with hands outstretched. 2+ patellar reflexes.       Labs and Studies:   Lab Results   Component Value Date     03/11/2021    CO2 26 03/11/2021    CHLORIDE 107 03/11/2021    CR 0.57 03/11/2021    TRIG 52 03/11/2021    HDL 88 03/11/2021    HGB 12.6 03/11/2021     Recent Labs   Lab Test 03/11/21  1109 10/09/18  1742   FT3  --  3.9   TSH 1.10  --      Lab Results   Component Value Date    JEOVANY 8.6 03/11/2021    JEOVANY 8.4 (L) 12/17/2020    JEOVANY 8.1 (L) 08/03/2020    JEOVANY 8.8 02/18/2020    ALBUMIN 3.8 03/11/2021    ALT 26 03/11/2021    AST 14 03/11/2021    BILITOTAL 0.2 03/11/2021    CR 0.57 03/11/2021    CR 0.59 12/17/2020    CR 0.71 08/03/2020      03/11/2021    TSH 1.10 03/11/2021    ALKPHOS 66 03/11/2021    HGB 12.6 03/11/2021                           Answers for HPI/ROS submitted by the patient on 1/6/2023  General Symptoms: No  Skin Symptoms: No  HENT Symptoms: No  EYE SYMPTOMS: No  HEART SYMPTOMS: No  LUNG SYMPTOMS: No  INTESTINAL SYMPTOMS: No  URINARY SYMPTOMS: No  GYNECOLOGIC SYMPTOMS: No  BREAST SYMPTOMS: No  SKELETAL SYMPTOMS: No  BLOOD SYMPTOMS: No  NERVOUS SYSTEM SYMPTOMS: No  MENTAL HEALTH SYMPTOMS: No          Again, thank you for allowing me to participate in the care of your patient.        Sincerely,        Lilia Rowley MD

## 2023-01-13 NOTE — NURSING NOTE
Garry Ferreira's goals for this visit include:   Chief Complaint   Patient presents with     New Patient     Alopecia       She requests these members of her care team be copied on today's visit information: Yes    PCP: No Ref-Primary, Physician    Referring Provider:  Sandhya Agudelo    There were no vitals taken for this visit.    Do you need any medication refills at today's visit? No

## 2023-01-16 LAB
GLIADIN IGA SER-ACNC: 0.5 U/ML
GLIADIN IGG SER-ACNC: 1.4 U/ML
IGA SERPL-MCNC: 77 MG/DL (ref 84–499)

## 2023-01-25 DIAGNOSIS — L65.9 LOSS OF HAIR: ICD-10-CM

## 2023-01-30 NOTE — TELEPHONE ENCOUNTER
minoxidil (LONITEN) 2.5 MG tablet      Last Written Prescription Date:  8/11/2022  Last Fill Quantity: 15,   # refills: 4  Last Office Visit : 12/29/2022  [facility] (staffed by Dr Melvin))  Future Office visit:  2/23/2023    Routing refill request to on-call Dermatology provider for review/approval because:  Medication not on the Dermatology refill protocol.  - plan last visit 12/29/2022: # Alopecia Areata - Increase minoxidil to 2.5mg (1 full tablet) daily  - pharmacy requesting 90 day supply

## 2023-01-31 DIAGNOSIS — L63.9 ALOPECIA AREATA: ICD-10-CM

## 2023-01-31 DIAGNOSIS — L65.9 LOSS OF HAIR: ICD-10-CM

## 2023-01-31 RX ORDER — MINOXIDIL 2.5 MG/1
2.5 TABLET ORAL DAILY
Qty: 90 TABLET | Refills: 3 | Status: SHIPPED | OUTPATIENT
Start: 2023-01-31 | End: 2023-06-11

## 2023-01-31 NOTE — TELEPHONE ENCOUNTER
Received refill request for minoxidil po as the resident on call. Reviewed patient's chart and attached communication. Patient last seen 12/2022 for AA. RTC 2/2023. 90 day with 1 year fills provided    After reviewing the medication list and assessment and plan from last visit, the refill request was accepted.    CC'ing Dr. Ospina and Olegario as MAURILIO Paris MD  Internal Medicine - Dermatology PGY5

## 2023-02-03 RX ORDER — PREDNISONE 2.5 MG/1
5 TABLET ORAL DAILY
Qty: 90 TABLET | Refills: 0 | Status: SHIPPED | OUTPATIENT
Start: 2023-02-03 | End: 2023-03-23

## 2023-02-03 NOTE — TELEPHONE ENCOUNTER
"PREDNISONE 2.5 MG TABLET   Take 4 tablets (10 mg) by mouth daily   Last Written Prescription Date:  9/22/22  Last Fill Quantity: 60,   # refills: 3  Last Office Visit : 12/29/22  Future Office visit:  2/23/23    Routing refill request to provider for review/approval because:  Drug not on the [facility] refill [facility]  Last note: 12/29/22 \"Decrease prednisone as follows:               10mg (4 pills) one day, alternating with 5mg (2 pills) next day, repeat \"      "

## 2023-02-03 NOTE — TELEPHONE ENCOUNTER
Received refill request for pred as the resident on call. Reviewed patient's chart and attached communication. Patient last seen 12/2022 for AA. RTC 02/2023.     After reviewing the medication list and assessment and plan from last visit, the refill request was accepted.    CC'ing Dr. Ospina and Olegario as MAURILIO Paris MD  Internal Medicine - Dermatology PGY5

## 2023-02-20 ENCOUNTER — TELEPHONE (OUTPATIENT)
Dept: DERMATOLOGY | Facility: CLINIC | Age: 23
End: 2023-02-20
Payer: COMMERCIAL

## 2023-03-01 ENCOUNTER — TELEPHONE (OUTPATIENT)
Dept: DERMATOLOGY | Facility: CLINIC | Age: 23
End: 2023-03-01

## 2023-03-01 DIAGNOSIS — L65.9 LOSS OF HAIR: ICD-10-CM

## 2023-03-01 DIAGNOSIS — L63.9 ALOPECIA AREATA: ICD-10-CM

## 2023-03-03 NOTE — TELEPHONE ENCOUNTER
PREDNISONE 2.5 MG TABLET  Last Written Prescription Date:   2/3/2023  Last Fill Quantity: 90,   # refills: 0  Last Office Visit :  12/29/2022  Future Office visit:   4/6/2023    Routing refill request to provider for review/approval because:  Drug not on the [facility], [facility] or [facility] refill protocol or controlled substance      Keyana Garza RN  Central Triage Red Flags/Med Refills

## 2023-03-23 ENCOUNTER — MYC REFILL (OUTPATIENT)
Dept: DERMATOLOGY | Facility: CLINIC | Age: 23
End: 2023-03-23
Payer: COMMERCIAL

## 2023-03-23 DIAGNOSIS — L65.9 LOSS OF HAIR: ICD-10-CM

## 2023-03-23 DIAGNOSIS — L63.9 ALOPECIA AREATA: ICD-10-CM

## 2023-03-24 NOTE — TELEPHONE ENCOUNTER
"From 12/29/2022 office visit:  \"Assessment & Plan:      # Alopecia Areata. Chronic. Not at goal. Steroid responsive with regular IL-K injections, low-dose oral prednisone (currently 10mg alternating with 5mg daily), and oral minoxidil (increased to 2.5mg daily at last visit). Previously on xeljanz for ~1 year however not well tolerated (GI bleed but question if related to long term prednisone). PA for opzelura (ruxolitinib) cream recently denied x2.   - IL-K injections today (see procedure note below)   - Increase minoxidil to 2.5mg (1 full tablet) daily    - Decrease prednisone as follows:               10mg (4 pills) one day, alternating with 5mg (2 pills) next day, repeat   - Continue calcium / vitamin D supplements   - Endocrine referral placed for baseline evaluation of bone health with long-term prednisone  - May consider second-gen ESTELLE inhibitors in the future   - Continue lidex solution daily   - Continue ketoconazole shampoo 3-4x/week, leave on 5 min prior to rinsing   - Continue latisse for eyebrows as needed  \"  "

## 2023-03-24 NOTE — TELEPHONE ENCOUNTER
Pt calling stating Dr. Jamee Ríos recommended a 2 week follow up at [address] [place] [address]. Pt will have MRI done on Thursday and will review at f/u. Offered pt cancellation opening on 10/22/19. Pt accepted. predniSONE (DELTASONE) 2.5 MG tablet  Last Written Prescription Date:  2/3/2023  Last Fill Quantity: 90,   # refills: 0  Last Office Visit :  12/29/2022  Future Office visit:   4/6/2023  Routing refill request to provider for review/approval because:  Med not on protocol,  Refer to clinic for review   Also, not enough tabs to cover Pt until visit in April.  Please send new order.       Keyana Garza RN  Central Triage Red Flags/Med Refills

## 2023-03-24 NOTE — TELEPHONE ENCOUNTER
Patient last seen 12/29/2022 by Dr. Ospina. Has follow-up 4/6/2023. Patient's pharmacy requesting prednisone refill. Yan and refill team already deferred to provider please advise. According to refill team patient does not have enough to last until follow-up visit.

## 2023-03-26 ENCOUNTER — MYC REFILL (OUTPATIENT)
Dept: DERMATOLOGY | Facility: CLINIC | Age: 23
End: 2023-03-26
Payer: COMMERCIAL

## 2023-03-26 DIAGNOSIS — L65.9 LOSS OF HAIR: ICD-10-CM

## 2023-03-26 DIAGNOSIS — L63.9 ALOPECIA AREATA: ICD-10-CM

## 2023-03-26 RX ORDER — PREDNISONE 2.5 MG/1
5 TABLET ORAL DAILY
Qty: 90 TABLET | Refills: 0 | Status: CANCELLED | OUTPATIENT
Start: 2023-03-26

## 2023-03-28 ENCOUNTER — MYC REFILL (OUTPATIENT)
Dept: DERMATOLOGY | Facility: CLINIC | Age: 23
End: 2023-03-28
Payer: COMMERCIAL

## 2023-03-28 DIAGNOSIS — L65.9 LOSS OF HAIR: ICD-10-CM

## 2023-03-28 DIAGNOSIS — L63.9 ALOPECIA AREATA: ICD-10-CM

## 2023-03-28 RX ORDER — PREDNISONE 2.5 MG/1
5 TABLET ORAL DAILY
Qty: 90 TABLET | Refills: 0 | Status: CANCELLED | OUTPATIENT
Start: 2023-03-28

## 2023-03-28 NOTE — TELEPHONE ENCOUNTER
[facility] Call Center    Phone Message    May a detailed message be left on voicemail: yes     Reason for Call: Other: Pt's mother calling about refill. Was not at pharmacy. Pt is out of Rx. Please call [phone]. Thanks!      Action Taken: Message routed to:  [facility] DERM    Travel Screening: Not Applicable

## 2023-03-29 RX ORDER — PREDNISONE 2.5 MG/1
5 TABLET ORAL DAILY
Qty: 50 TABLET | Refills: 0 | Status: SHIPPED | OUTPATIENT
Start: 2023-03-29 | End: 2023-06-11

## 2023-03-29 NOTE — TELEPHONE ENCOUNTER
Patient transferred to nurse line from call center. Patient requesting refill of prednisone. States she alternates medication 5mg and 10mg daily. Informed patient that refill team, CAIN and Dr. Melvin all deferred approval of this refill request to Dr. Ospina. Patient requesting they reconsider as she is out of medication and she was told that she should not stop taking this. States last dose was on 3/27 and she no longer has any medication. Patient last seen 12/29 and 2/23 appt was provider cancelled. Informed patient that Dr. Ospina not in clinic until her appointment on 4/6 but message would be sent requesting sohail refill.    Teto White, EMT

## 2023-03-29 NOTE — TELEPHONE ENCOUNTER
Sandhya Melvin MD Karimi, Karaneh, MD; Johan Palmer MD; Keyana Garza RN 3 weeks ago     TANISHA REYES,   This is Dr. Ospina's continuity clinic patient so am forwarding this message to her. Have also noted a return visit is scheduled in about a month, early April. Dr. Ospina,please update.   Thanks,         Johan Palmer MD Hordinsky, Maria Kramarczuk, MD 3 weeks ago     CB  Unsure of the signature on this prescription and how much/how long the patient should be on prednisone. Please review and refill if indicated. Thank you!   Fanny

## 2023-03-30 NOTE — TELEPHONE ENCOUNTER
"Patient informed in 3/28/2023 mychart encounter. See this encounter for more information.  \"Chrissy Qureshi LPN  to Garry Ferreira    KK       2:46 PM  Garry     A sohail refill has been provided and sent to your pharmacy: [facility] on [address]     We will see you next month at your appointment.        Thank you,  Hoa SHOOK LPN\"  "

## 2023-04-06 ENCOUNTER — OFFICE VISIT (OUTPATIENT)
Dept: DERMATOLOGY | Facility: CLINIC | Age: 23
End: 2023-04-06
Payer: COMMERCIAL

## 2023-04-06 VITALS — DIASTOLIC BLOOD PRESSURE: 75 MMHG | SYSTOLIC BLOOD PRESSURE: 126 MMHG | HEART RATE: 83 BPM

## 2023-04-06 DIAGNOSIS — L63.9 ALOPECIA AREATA: Primary | ICD-10-CM

## 2023-04-06 PROCEDURE — 99213 OFFICE O/P EST LOW 20 MIN: CPT | Mod: 25

## 2023-04-06 PROCEDURE — 2894A PR INJECTION INTO SKIN LESIONS >7: CPT | Mod: GC

## 2023-04-06 PROCEDURE — 2894A VOIDCORRECT: CPT

## 2023-04-06 RX ORDER — PREDNISONE 2.5 MG/1
TABLET ORAL
Qty: 73 TABLET | Refills: 0 | Status: SHIPPED | OUTPATIENT
Start: 2023-04-06 | End: 2023-06-08

## 2023-04-06 ASSESSMENT — PAIN SCALES - GENERAL: PAINLEVEL: NO PAIN (0)

## 2023-04-06 NOTE — PROGRESS NOTES
[facility] Dermatology Note  Encounter Date: Apr 6, 2023  Office Visit     Dermatology Problem List:  1.  Alopecia areata. Prior: tofacitinib 5mg BID (continued to progress), augmented betamethasone. S/p prednisone taper starting at 40mg tapered over 6 weeks (initiated 5/2021), increased from 5mg every day to 10mg daily 9/22/22.   - Current tx: Prednisone 10mg (2.5mgX4) daily, Minoxidil 1.25mg (1/2tab) daily, lidex solution daily, IL-K injections q6-8w, ketoconazole shampoo, latisse   ____________________________________________     Assessment & Plan:      # Alopecia Areata. Chronic. Not at goal. Steroid responsive with regular IL-K injections, low-dose oral prednisone (currently on very low dose and slow taper with goal of being off prednisone by next follow-up), and oral minoxidil. Previously on xeljanz for ~1 year however not well tolerated (GI bleed but question if related to long term prednisone vs other). PA for opzelura (ruxolitinib) cream recently denied x2.   - IL-K injections today (see procedure note below)   - Continue minoxidil 2.5mg (1 full tablet) daily    - Will continue decreasing dose of prednisone as follows:   5mg (2 pills) daily for 3 weeks, then    2.5mg (1 pils) daily for 3 weeks, then    2.5 (1 pill) every other day for 3 weeks   - Continue calcium / vitamin D supplements   - Continue lidex solution daily   - Continue ketoconazole shampoo 3-4x/week, leave on 5 min prior to rinsing   - Continue latisse for eyebrows as needed    - Future consideration: second-gen ESTELLE inhibitor     Procedures Performed:   - Intra-lesional triamcinolone procedure note. After positioning and cleansing with isopropyl alcohol, 4 total mL of triamcinolone 10 mg/mL was injected into 14 lesion(s) on the scalp. The patient tolerated the procedure well and left the dermatology clinic in good condition.     Follow-up: 6-8 week(s) in-person, or earlier for new or changing lesions    Staff and Resident:  "    Tammy Ospina MD PGY4    Patient was staffed with Dr. Kevin YORK, Tressa Brown MD, saw this patient with the resident and agree with the resident s findings and plan of care as documented in the resident s note. Injections reviewed with resident.     ____________________________________________    CC: Hair Loss (Alopecia follow-up: notes condition as \"pretty good, it's growing back\")    HPI:  Ms. Garry Ferreira is a(n) 23 year old female who presents today as a return patient for hair loss secondary to alopecia areata. Presents to clinic today with mom alongside who offers supplemental history. She continues on oral prednisone (we began tapering at last visit, decreasing from 10mg daily, now to 10mg alternating with 5mg daily) and 2.5 mg minoxidil since last visit, when she also had 4cc of IL-K 10 injected on the scalp. Since that time, she has seen stable regrowth on the frontal and parietal scalp. However there is still more room for improvement. Patient is otherwise feeling well, without additional skin concerns.    Labs Reviewed:  N/A    Physical Exam:  SKIN: Focused examination of hair, face, hands  and scalp was performed.   - /75   Pulse 83    - notable hair growth seen today in all regions on scalp with intermixed patches of alopecia on occipital and temporal scalp bilaterally; hair pull test negative today; eyebrows and eyelashes normal density; no other lesions of concern on areas examined.     Medications:  Current Outpatient Medications   Medication     fluocinonide (LIDEX) 0.05 % external solution     minoxidil (LONITEN) 2.5 MG tablet     NONFORMULARY     norgestimate-ethinyl estradiol (ORTHO-CYCLEN) 0.25-35 MG-MCG tablet     predniSONE (DELTASONE) 2.5 MG tablet     predniSONE (DELTASONE) 2.5 MG tablet     augmented betamethasone dipropionate (DIPROLENE-AF) 0.05 % external cream     augmented betamethasone dipropionate (DIPROLENE-AF) 0.05 % external ointment     clobetasol (TEMOVATE) " 0.05 % external cream     clobetasol propionate (CLOBEX) 0.05 % external shampoo     ketoconazole (NIZORAL) 2 % external shampoo     Ruxolitinib Phosphate (OPZELURA) 1.5 % CREA     tofacitinib (XELJANZ) 5 MG PO tablet     Current Facility-Administered Medications   Medication     triamcinolone acetonide (KENALOG-10) injection 20 mg     triamcinolone acetonide (KENALOG-10) injection 40 mg     triamcinolone acetonide (KENALOG-10) injection 40 mg     triamcinolone acetonide (KENALOG-10) injection 40 mg     triamcinolone acetonide (KENALOG-10) injection 40 mg     triamcinolone acetonide (KENALOG-10) injection 40 mg     triamcinolone acetonide (KENALOG-10) injection 40 mg     triamcinolone acetonide (KENALOG-10) injection 40 mg     triamcinolone acetonide (KENALOG-10) injection 40 mg     triamcinolone acetonide (KENALOG-10) injection 46 mg      Past Medical History:   Patient Active Problem List   Diagnosis     Alopecia areata     Medication monitoring encounter     No past medical history on file.

## 2023-04-06 NOTE — NURSING NOTE
Drug Administration Record    Prior to injection, verified patient identity using patient's name and date of birth.  Due to injection administration, patient instructed to remain in clinic for 15 minutes  afterwards, and to report any adverse reaction to me immediately.    Drug Name: triamcinolone acetonide(kenalog)  Dose: 4mL of triamcinolone 10mg/mL, 40mg dose  Route administered: ID  NDC #: Kenalog-10 (6323-8245-03)  Amount of waste(mL):1mL  Reason for waste: Multi dose vial    LOT #: [id number]  SITE: See provider's notes  : [employer]  EXPIRATION DATE: 08/31/2024

## 2023-04-06 NOTE — LETTER
4/6/2023       RE: Garry Ferreira  [address] [address]     Dear Colleague,    Thank you for referring your patient, Garry Ferreira, to the [facility] DERMATOLOGY CLINIC [place] at [facility]. Please see a copy of my visit note below.    [facility] Dermatology Note  Encounter Date: Apr 6, 2023  Office Visit     Dermatology Problem List:  1.  Alopecia areata. Prior: tofacitinib 5mg BID (continued to progress), augmented betamethasone. S/p prednisone taper starting at 40mg tapered over 6 weeks (initiated 5/2021), increased from 5mg every day to 10mg daily 9/22/22.   - Current tx: Prednisone 10mg (2.5mgX4) daily, Minoxidil 1.25mg (1/2tab) daily, lidex solution daily, IL-K injections q6-8w, ketoconazole shampoo, latisse   ____________________________________________     Assessment & Plan:      # Alopecia Areata. Chronic. Not at goal. Steroid responsive with regular IL-K injections, low-dose oral prednisone (currently on very low dose and slow taper with goal of being off prednisone by next follow-up), and oral minoxidil. Previously on xeljanz for ~1 year however not well tolerated (GI bleed but question if related to long term prednisone vs other). PA for opzelura (ruxolitinib) cream recently denied x2.   - IL-K injections today (see procedure note below)   - Continue minoxidil 2.5mg (1 full tablet) daily    - Will continue decreasing dose of prednisone as follows:   5mg (2 pills) daily for 3 weeks, then    2.5mg (1 pils) daily for 3 weeks, then    2.5 (1 pill) every other day for 3 weeks   - Continue calcium / vitamin D supplements   - Continue lidex solution daily   - Continue ketoconazole shampoo 3-4x/week, leave on 5 min prior to rinsing   - Continue latisse for eyebrows as needed    - Future consideration: second-gen ESTELLE inhibitor     Procedures Performed:   - Intra-lesional triamcinolone procedure note. After  "positioning and cleansing with isopropyl alcohol, 4 total mL of triamcinolone 10 mg/mL was injected into 14 lesion(s) on the scalp. The patient tolerated the procedure well and left the dermatology clinic in good condition.     Follow-up: 6-8 week(s) in-person, or earlier for new or changing lesions    Staff and Resident:     Tammy Ospina MD PGY4    Patient was staffed with Dr. Kevin YORK, Tressa Brown MD, saw this patient with the resident and agree with the resident s findings and plan of care as documented in the resident s note. Injections reviewed with resident.     ____________________________________________    CC: Hair Loss (Alopecia follow-up: notes condition as \"pretty good, it's growing back\")    HPI:  Ms. Garry Ferreira is a(n) 23 year old female who presents today as a return patient for hair loss secondary to alopecia areata. Presents to clinic today with mom alongside who offers supplemental history. She continues on oral prednisone (we began tapering at last visit, decreasing from 10mg daily, now to 10mg alternating with 5mg daily) and 2.5 mg minoxidil since last visit, when she also had 4cc of IL-K 10 injected on the scalp. Since that time, she has seen stable regrowth on the frontal and parietal scalp. However there is still more room for improvement. Patient is otherwise feeling well, without additional skin concerns.    Labs Reviewed:  N/A    Physical Exam:  SKIN: Focused examination of hair, face, hands  and scalp was performed.   - /75   Pulse 83    - notable hair growth seen today in all regions on scalp with intermixed patches of alopecia on occipital and temporal scalp bilaterally; hair pull test negative today; eyebrows and eyelashes normal density; no other lesions of concern on areas examined.     Medications:  Current Outpatient Medications   Medication    fluocinonide (LIDEX) 0.05 % external solution    minoxidil (LONITEN) 2.5 MG tablet    NONFORMULARY    " norgestimate-ethinyl estradiol (ORTHO-CYCLEN) 0.25-35 MG-MCG tablet    predniSONE (DELTASONE) 2.5 MG tablet    predniSONE (DELTASONE) 2.5 MG tablet    augmented betamethasone dipropionate (DIPROLENE-AF) 0.05 % external cream    augmented betamethasone dipropionate (DIPROLENE-AF) 0.05 % external ointment    clobetasol (TEMOVATE) 0.05 % external cream    clobetasol propionate (CLOBEX) 0.05 % external shampoo    ketoconazole (NIZORAL) 2 % external shampoo    Ruxolitinib Phosphate (OPZELURA) 1.5 % CREA    tofacitinib (XELJANZ) 5 MG PO tablet     Current Facility-Administered Medications   Medication    triamcinolone acetonide (KENALOG-10) injection 20 mg    triamcinolone acetonide (KENALOG-10) injection 40 mg    triamcinolone acetonide (KENALOG-10) injection 40 mg    triamcinolone acetonide (KENALOG-10) injection 40 mg    triamcinolone acetonide (KENALOG-10) injection 40 mg    triamcinolone acetonide (KENALOG-10) injection 40 mg    triamcinolone acetonide (KENALOG-10) injection 40 mg    triamcinolone acetonide (KENALOG-10) injection 40 mg    triamcinolone acetonide (KENALOG-10) injection 40 mg    triamcinolone acetonide (KENALOG-10) injection 46 mg      Past Medical History:   Patient Active Problem List   Diagnosis    Alopecia areata    Medication monitoring encounter     No past medical history on file.

## 2023-04-06 NOTE — PATIENT INSTRUCTIONS
5mg (2 pills) daily for 3 weeks, then   2.5 mg (1 pill) daily for 3 weeks, then   2.5 mg every other day for 3 weeks

## 2023-04-06 NOTE — NURSING NOTE
"Dermatology Rooming Note    Garry Ferreira's goals for this visit include:   Chief Complaint   Patient presents with     Hair Loss     Alopecia follow-up: notes condition as \"pretty good, it's growing back\"     Chrissy Qureshi LPN    "

## 2023-05-30 RX ORDER — PREDNISONE 2.5 MG/1
5 TABLET ORAL DAILY
Qty: 90 TABLET | OUTPATIENT
Start: 2023-05-30

## 2023-06-04 ENCOUNTER — HEALTH MAINTENANCE LETTER (OUTPATIENT)
Age: 23
End: 2023-06-04

## 2023-06-08 ENCOUNTER — OFFICE VISIT (OUTPATIENT)
Dept: DERMATOLOGY | Facility: CLINIC | Age: 23
End: 2023-06-08
Payer: COMMERCIAL

## 2023-06-08 VITALS — HEART RATE: 80 BPM | DIASTOLIC BLOOD PRESSURE: 83 MMHG | SYSTOLIC BLOOD PRESSURE: 125 MMHG

## 2023-06-08 DIAGNOSIS — L65.9 LOSS OF HAIR: ICD-10-CM

## 2023-06-08 DIAGNOSIS — L63.9 ALOPECIA AREATA: Primary | ICD-10-CM

## 2023-06-08 PROCEDURE — 11900 INJECT SKIN LESIONS </W 7: CPT | Mod: GC | Performed by: DERMATOLOGY

## 2023-06-08 PROCEDURE — 99213 OFFICE O/P EST LOW 20 MIN: CPT | Mod: 25 | Performed by: DERMATOLOGY

## 2023-06-08 ASSESSMENT — PAIN SCALES - GENERAL: PAINLEVEL: NO PAIN (0)

## 2023-06-08 NOTE — NURSING NOTE
Drug Administration Record    Prior to injection, verified patient identity using patient's name and date of birth.  Due to injection administration, patient instructed to remain in clinic for 15 minutes  afterwards, and to report any adverse reaction to me immediately.    Drug Name: triamcinolone acetonide(kenalog)  Dose: 4mL of triamcinolone 10mg/mL, 40mg dose  Route administered: ID  NDC #: Kenalog-10 (8556-0737-09)  Amount of waste(mL):1mL  Reason for waste: Multi dose vial    LOT #: [id number]  SITE: See provider's notes  : [employer]  EXPIRATION DATE: 08/2024

## 2023-06-08 NOTE — LETTER
6/8/2023       RE: Garry Ferreira  [address] [address]     Dear Colleague,    Thank you for referring your patient, Garry Ferreira, to the [facility] DERMATOLOGY CLINIC [place] at [facility]. Please see a copy of my visit note below.    [facility] Dermatology Note  Encounter Date: Jun 8, 2023  Office Visit     Dermatology Problem List:  1.  Alopecia areata. Prior: tofacitinib 5mg BID (continued to progress), augmented betamethasone. S/p prednisone taper starting at 40mg tapered over 6 weeks (initiated 5/2021), increased from 5mg every day to 10mg daily 9/22/22, slow taper to discontinuation by 6/2023.  - Current tx: Minoxidil 2.5mg daily, lidex solution daily, IL-K injections q6-8w, ketoconazole shampoo, latisse prn, IL-K injections q6-8w prn to active areas  ____________________________________________    Assessment & Plan:     # Alopecia Areata. Chronic. Not at goal. Steroid responsive with regular IL-K injections and daily oral minoxidil. Previously on xeljanz for ~1 year however not well tolerated (GI bleed but question if related to long term prednisone vs other). PA for opzelura (ruxolitinib) cream recently denied x2. Now off prednisone after slow taper.   - IL-K injections today (see procedure note below)  - Continue minoxidil 2.5mg (1 full tablet) daily  (okay to send refills on incoming requests)  - Continue lidex solution daily   - Continue ketoconazole shampoo 3-4x/week, leave on 5 min prior to rinsing   - Continue latisse for eyebrows as needed    - Future consideration if needed: second-gen ESTELLE inhibitor  - Message sent on 4/7/23 re: follow-up with Dr. Melvin in [place] - will re-connect with schedulers to ensure this follow-up is scheduled as planned      Procedures Performed:   - Intra-lesional triamcinolone procedure note. After verbal consent and review of risk of pain and skin  thinning/atrophy, positioning and cleansing with isopropyl alcohol, 4 total mL of triamcinolone 10 mg/mL was injected into 7 lesion(s) on the scalp. The patient tolerated the procedure well and left the dermatology clinic in good condition.    Follow-up: 8 weeks with Dr. Melvin in [place] (patient preference)    Staff and Resident:     Tammy Ospina MD PGY4     Patient was staffed with Dr. Kevin YORK, Tressa Brown MD, saw this patient with the resident and agree with the resident s findings and plan of care as documented in the resident s note.  Injections reviewed with resident.     ____________________________________________    CC: Hair Loss (Follow-up Alopecia areata, reports that things are going good)    HPI:  Ms. Garry Ferreira is a(n) 23 year old female who presents today as a return patient for hair loss secondary to alopecia areata. Presents to clinic today with grandma alongside offering supplemental history and support. Garry is now fully tapered off of prednisone. She continues on oral minoxidil (full 2.5mg tablet daily) and topical regimen (ketoconazole shampoo, lidex solution). She is very pleased with her progress so far, yet there is room for improvement. She is otherwise feeling well, without additional skin concerns.    Labs Reviewed:  N/A    Physical Exam:  Vitals: /83   Pulse 80   SKIN: Focused examination of face, scalp, hair was performed.  - continued hair growth seen today in all regions on scalp with intermixed patches of alopecia on occipital and temporal scalp bilaterally; hair pull test negative today; eyebrows and eyelashes normal density;  - no other lesions of concern on areas examined.     Medications:  Current Outpatient Medications   Medication    fluocinonide (LIDEX) 0.05 % external solution    minoxidil (LONITEN) 2.5 MG tablet    NONFORMULARY    norgestimate-ethinyl estradiol (ORTHO-CYCLEN) 0.25-35 MG-MCG tablet    augmented betamethasone dipropionate  (DIPROLENE-AF) 0.05 % external cream    augmented betamethasone dipropionate (DIPROLENE-AF) 0.05 % external ointment    clobetasol (TEMOVATE) 0.05 % external cream    clobetasol propionate (CLOBEX) 0.05 % external shampoo    ketoconazole (NIZORAL) 2 % external shampoo    predniSONE (DELTASONE) 2.5 MG tablet    predniSONE (DELTASONE) 2.5 MG tablet    Ruxolitinib Phosphate (OPZELURA) 1.5 % CREA    tofacitinib (XELJANZ) 5 MG PO tablet     Current Facility-Administered Medications   Medication    triamcinolone acetonide (KENALOG-10) injection 20 mg    triamcinolone acetonide (KENALOG-10) injection 40 mg    triamcinolone acetonide (KENALOG-10) injection 40 mg    triamcinolone acetonide (KENALOG-10) injection 40 mg    triamcinolone acetonide (KENALOG-10) injection 40 mg    triamcinolone acetonide (KENALOG-10) injection 40 mg    triamcinolone acetonide (KENALOG-10) injection 40 mg    triamcinolone acetonide (KENALOG-10) injection 40 mg    triamcinolone acetonide (KENALOG-10) injection 40 mg    triamcinolone acetonide (KENALOG-10) injection 40 mg    triamcinolone acetonide (KENALOG-10) injection 46 mg      Past Medical History:   Patient Active Problem List   Diagnosis    Alopecia areata    Medication monitoring encounter

## 2023-06-08 NOTE — NURSING NOTE
Dermatology Rooming Note    Garry Ferreira's goals for this visit include:   Chief Complaint   Patient presents with     Hair Loss     Follow-up Alopecia areata, reports that things are going good     Chrissy Qureshi LPN

## 2023-06-08 NOTE — PROGRESS NOTES
[facility] Dermatology Note  Encounter Date: Jun 8, 2023  Office Visit     Dermatology Problem List:  1.  Alopecia areata. Prior: tofacitinib 5mg BID (continued to progress), augmented betamethasone. S/p prednisone taper starting at 40mg tapered over 6 weeks (initiated 5/2021), increased from 5mg every day to 10mg daily 9/22/22, slow taper to discontinuation by 6/2023.  - Current tx: Minoxidil 2.5mg daily, lidex solution daily, IL-K injections q6-8w, ketoconazole shampoo, latisse prn, IL-K injections q6-8w prn to active areas  ____________________________________________    Assessment & Plan:     # Alopecia Areata. Chronic. Not at goal. Steroid responsive with regular IL-K injections and daily oral minoxidil. Previously on xeljanz for ~1 year however not well tolerated (GI bleed but question if related to long term prednisone vs other). PA for opzelura (ruxolitinib) cream recently denied x2. Now off prednisone after slow taper.   - IL-K injections today (see procedure note below)  - Continue minoxidil 2.5mg (1 full tablet) daily  (okay to send refills on incoming requests)  - Continue lidex solution daily   - Continue ketoconazole shampoo 3-4x/week, leave on 5 min prior to rinsing   - Continue latisse for eyebrows as needed    - Future consideration if needed: second-gen ESTELLE inhibitor  - Message sent on 4/7/23 re: follow-up with Dr. Melvin in [place] - will re-connect with schedulers to ensure this follow-up is scheduled as planned      Procedures Performed:   - Intra-lesional triamcinolone procedure note. After verbal consent and review of risk of pain and skin thinning/atrophy, positioning and cleansing with isopropyl alcohol, 4 total mL of triamcinolone 10 mg/mL was injected into 7 lesion(s) on the scalp. The patient tolerated the procedure well and left the dermatology clinic in good condition.    Follow-up: 8 weeks with Dr. Melvin in [place] (patient preference)    Staff and  Resident:     Tammy Ospina MD PGY4     Patient was staffed with Dr. Kevin YORK, Tressa Brown MD, saw this patient with the resident and agree with the resident s findings and plan of care as documented in the resident s note.  Injections reviewed with resident.     ____________________________________________    CC: Hair Loss (Follow-up Alopecia areata, reports that things are going good)    HPI:  Ms. Garry Ferreira is a(n) 23 year old female who presents today as a return patient for hair loss secondary to alopecia areata. Presents to clinic today with grandma alongside offering supplemental history and support. Garry is now fully tapered off of prednisone. She continues on oral minoxidil (full 2.5mg tablet daily) and topical regimen (ketoconazole shampoo, lidex solution). She is very pleased with her progress so far, yet there is room for improvement. She is otherwise feeling well, without additional skin concerns.    Labs Reviewed:  N/A    Physical Exam:  Vitals: /83   Pulse 80   SKIN: Focused examination of face, scalp, hair was performed.  - continued hair growth seen today in all regions on scalp with intermixed patches of alopecia on occipital and temporal scalp bilaterally; hair pull test negative today; eyebrows and eyelashes normal density;  - no other lesions of concern on areas examined.     Medications:  Current Outpatient Medications   Medication     fluocinonide (LIDEX) 0.05 % external solution     minoxidil (LONITEN) 2.5 MG tablet     NONFORMULARY     norgestimate-ethinyl estradiol (ORTHO-CYCLEN) 0.25-35 MG-MCG tablet     augmented betamethasone dipropionate (DIPROLENE-AF) 0.05 % external cream     augmented betamethasone dipropionate (DIPROLENE-AF) 0.05 % external ointment     clobetasol (TEMOVATE) 0.05 % external cream     clobetasol propionate (CLOBEX) 0.05 % external shampoo     ketoconazole (NIZORAL) 2 % external shampoo     predniSONE (DELTASONE) 2.5 MG tablet     predniSONE  (DELTASONE) 2.5 MG tablet     Ruxolitinib Phosphate (OPZELURA) 1.5 % CREA     tofacitinib (XELJANZ) 5 MG PO tablet     Current Facility-Administered Medications   Medication     triamcinolone acetonide (KENALOG-10) injection 20 mg     triamcinolone acetonide (KENALOG-10) injection 40 mg     triamcinolone acetonide (KENALOG-10) injection 40 mg     triamcinolone acetonide (KENALOG-10) injection 40 mg     triamcinolone acetonide (KENALOG-10) injection 40 mg     triamcinolone acetonide (KENALOG-10) injection 40 mg     triamcinolone acetonide (KENALOG-10) injection 40 mg     triamcinolone acetonide (KENALOG-10) injection 40 mg     triamcinolone acetonide (KENALOG-10) injection 40 mg     triamcinolone acetonide (KENALOG-10) injection 40 mg     triamcinolone acetonide (KENALOG-10) injection 46 mg      Past Medical History:   Patient Active Problem List   Diagnosis     Alopecia areata     Medication monitoring encounter

## 2023-06-11 RX ORDER — MINOXIDIL 2.5 MG/1
2.5 TABLET ORAL DAILY
Qty: 90 TABLET | Refills: 3 | Status: SHIPPED | OUTPATIENT
Start: 2023-06-11 | End: 2023-09-22

## 2023-06-11 RX ORDER — FLUOCINONIDE TOPICAL SOLUTION USP, 0.05% 0.5 MG/ML
SOLUTION TOPICAL
Qty: 60 ML | Refills: 5 | Status: SHIPPED | OUTPATIENT
Start: 2023-06-11 | End: 2023-09-22

## 2023-06-11 RX ORDER — KETOCONAZOLE 20 MG/ML
SHAMPOO TOPICAL
Qty: 120 ML | Refills: 11 | Status: SHIPPED | OUTPATIENT
Start: 2023-06-12 | End: 2023-09-22

## 2023-06-13 ENCOUNTER — TELEPHONE (OUTPATIENT)
Dept: DERMATOLOGY | Facility: CLINIC | Age: 23
End: 2023-06-13
Payer: COMMERCIAL

## 2023-06-13 NOTE — TELEPHONE ENCOUNTER
Talked with patients mother and set up 4 appointments between Dr. Melvin and  for ILK injections.     Porsche June EMT

## 2023-06-20 ENCOUNTER — MEDICAL CORRESPONDENCE (OUTPATIENT)
Dept: HEALTH INFORMATION MANAGEMENT | Facility: CLINIC | Age: 23
End: 2023-06-20
Payer: COMMERCIAL

## 2023-06-28 ENCOUNTER — TRANSCRIBE ORDERS (OUTPATIENT)
Dept: OTHER | Age: 23
End: 2023-06-28

## 2023-06-28 DIAGNOSIS — L63.9 ALOPECIA AREATA: Primary | ICD-10-CM

## 2023-07-06 DIAGNOSIS — L65.9 LOSS OF HAIR: ICD-10-CM

## 2023-07-06 DIAGNOSIS — L63.9 ALOPECIA AREATA: ICD-10-CM

## 2023-07-11 RX ORDER — PREDNISONE 2.5 MG/1
5 TABLET ORAL DAILY
Qty: 50 TABLET | Refills: 0 | OUTPATIENT
Start: 2023-07-11

## 2023-07-24 NOTE — PROGRESS NOTES
[facility] Dermatology Note  Encounter Date: Jul 25, 2023  Office Visit     Dermatology Problem List:  1.  Alopecia areata. Prior: tofacitinib 5mg BID (continued to progress), augmented betamethasone. S/p prednisone taper starting at 40mg tapered over 6 weeks (initiated 5/2021), increased from 5mg every day to 10mg daily 9/22/22, slow taper to discontinuation by 6/2023.  - Current tx: Minoxidil 2.5mg daily, lidex solution daily, IL-K injections q6-8w, ketoconazole shampoo, latisse prn, IL-K injections q6-8w prn to active areas  -ILK- 7/25/23  ____________________________________________     Assessment & Plan:        #Alopecia areata. Chronic, not at goal. Not currently using ketoconazole shampoo. Discussed treating with ILK injections. Pt is agreeable to the procedure. Focal dime sized patches on the scalp are noted on exam today.   -Continue minoxidil 2.5mg (1 full tablet) daily  -Continue lidex solution daily.   -See ILK procedure.   -Recommend using ketoconazole shampoo every other day.   Continue Latisse for eyebrows as needed.   -Future considerations: starting clobex shampoo, different  ESTELLE inhibitor   -Labs ordered: TSH with reflex T4, CBC with Diff, ferritin, zinc, and Vitamin D.    -BP:124/87    Procedures Performed:   - Intra-lesional triamcinolone procedure note. After verbal consent and review of risk of pain and skin thinning/atrophy, positioning and cleansing with isopropyl alcohol, 0.4 total mL of triamcinolone 10 mg/mL was injected into 30 site(s) in a grid-like pattern on the scalp. The patient tolerated the procedure well and left the dermatology clinic in good condition.      Follow-up: 2 month(s) in-person, or earlier for new or changing lesions    Staff and Scribe:     Scribe Disclosure:   Lakisha YORK, am serving as a scribe to document services personally performed by this physician, Dr. Sandhya Melvin, based on data collection and the provider's  statements to me.     Provider Disclosure:   The documentation recorded by the scribe accurately reflects the services I personally performed and the decisions made by me. ILK  njections were done by me.    Sandhya Melvin MD  Professor and Chair  Department of Dermatology  [facility]: Phone: [phone], Fax:[phone]  [facility]: Phone: [phone], Fax: [phone]      ____________________________________________    CC: Hair Loss (Patient here for ILK injections)    HPI:  Ms. Garry Ferreira is a 23 year old female who presents as a return patient for follow-up of hair loss, diagnosed as alopecia areata.   - Last seen on 6/8/23 by Dr. Brown and a resident.   - Shedding or thinning, or both: shedding   - Current tx: Minoxidil 2.5mg daily, lidex solution daily, IL-K injections q6-8w, ketoconazole shampoo, latisse prn, IL-K injections q6-8w prn to active areas  - Scalp or hair care habits/products: Lidex every night before bed (bottle lasts 1 month), reports not using ketoconazole shampoo,   no Any new medications, supplements, or products? (please list below)     no Scalp pain   no Scalp burning   no Scalp itching    no Eyebrow changes    no Eyelash changes   no Other body hair changes    no Nail changes    no Additional symptoms? (please list below)     - Overall course: chronic, not at goal    Patient is otherwise feeling well, in usual state of health, and has no additional skin concerns today.     Labs:  None.     Physical Exam:  GEN: Well developed, well-nourished, in no acute distress, in a pleasant mood.    SKIN: Focused examination of scalp, face and hands was performed.  SCALP -,   -focal dime-sized patches of alopecia   - no diffuse erythema   - no perifollicular erythema  - no perifollicular scale   - no scaling of the scalp   - positive hair pull test on the right side   - no scalp  folliculitis/pustules   - In comparison to prior photographs, there are focal dime sized patches on the scalp     FACE -  -  normal eyelash density  -  normal eyebrow density  HANDS -  -  no nail pitting or dystrophy   - No other lesions of concern on areas examined.     Medications:  Current Outpatient Medications   Medication     augmented betamethasone dipropionate (DIPROLENE-AF) 0.05 % external cream     augmented betamethasone dipropionate (DIPROLENE-AF) 0.05 % external ointment     clobetasol (TEMOVATE) 0.05 % external cream     clobetasol propionate (CLOBEX) 0.05 % external shampoo     fluocinonide (LIDEX) 0.05 % external solution     ketoconazole (NIZORAL) 2 % external shampoo     minoxidil (LONITEN) 2.5 MG tablet     NONFORMULARY     norgestimate-ethinyl estradiol (ORTHO-CYCLEN) 0.25-35 MG-MCG tablet     Ruxolitinib Phosphate (OPZELURA) 1.5 % CREA     tofacitinib (XELJANZ) 5 MG PO tablet     Current Facility-Administered Medications   Medication     triamcinolone acetonide (KENALOG-10) injection 20 mg     triamcinolone acetonide (KENALOG-10) injection 40 mg     triamcinolone acetonide (KENALOG-10) injection 40 mg     triamcinolone acetonide (KENALOG-10) injection 40 mg     triamcinolone acetonide (KENALOG-10) injection 40 mg     triamcinolone acetonide (KENALOG-10) injection 40 mg     triamcinolone acetonide (KENALOG-10) injection 40 mg     triamcinolone acetonide (KENALOG-10) injection 40 mg     triamcinolone acetonide (KENALOG-10) injection 40 mg     triamcinolone acetonide (KENALOG-10) injection 40 mg     triamcinolone acetonide (KENALOG-10) injection 46 mg      Past Medical History:   Patient Active Problem List   Diagnosis     Alopecia areata     Medication monitoring encounter     No past medical history on file.    CC No referring provider defined for this encounter. on close of this encounter.

## 2023-07-25 ENCOUNTER — OFFICE VISIT (OUTPATIENT)
Dept: DERMATOLOGY | Facility: CLINIC | Age: 23
End: 2023-07-25
Payer: COMMERCIAL

## 2023-07-25 DIAGNOSIS — L65.9 LOSS OF HAIR: Primary | ICD-10-CM

## 2023-07-25 DIAGNOSIS — M35.9 AUTOIMMUNE DISEASE (H): ICD-10-CM

## 2023-07-25 DIAGNOSIS — L63.9 ALOPECIA AREATA: ICD-10-CM

## 2023-07-25 LAB
ALBUMIN SERPL BCG-MCNC: 4.6 G/DL (ref 3.5–5.2)
ALP SERPL-CCNC: 61 U/L (ref 35–104)
ALT SERPL W P-5'-P-CCNC: 14 U/L (ref 0–50)
ANION GAP SERPL CALCULATED.3IONS-SCNC: 12 MMOL/L (ref 7–15)
AST SERPL W P-5'-P-CCNC: 19 U/L (ref 0–45)
BASOPHILS # BLD AUTO: 0.1 10E3/UL (ref 0–0.2)
BASOPHILS NFR BLD AUTO: 1 %
BILIRUB SERPL-MCNC: 0.3 MG/DL
BUN SERPL-MCNC: 9.3 MG/DL (ref 6–20)
CALCIUM SERPL-MCNC: 9.4 MG/DL (ref 8.6–10)
CHLORIDE SERPL-SCNC: 104 MMOL/L (ref 98–107)
CREAT SERPL-MCNC: 0.66 MG/DL (ref 0.51–0.95)
DEPRECATED CALCIDIOL+CALCIFEROL SERPL-MC: 65 UG/L (ref 20–75)
DEPRECATED HCO3 PLAS-SCNC: 24 MMOL/L (ref 22–29)
EOSINOPHIL # BLD AUTO: 0.7 10E3/UL (ref 0–0.7)
EOSINOPHIL NFR BLD AUTO: 9 %
ERYTHROCYTE [DISTWIDTH] IN BLOOD BY AUTOMATED COUNT: 12.2 % (ref 10–15)
FERRITIN SERPL-MCNC: 59 NG/ML (ref 6–175)
GFR SERPL CREATININE-BSD FRML MDRD: >90 ML/MIN/1.73M2
GLUCOSE SERPL-MCNC: 99 MG/DL (ref 70–99)
HCT VFR BLD AUTO: 40.8 % (ref 35–47)
HGB BLD-MCNC: 13.4 G/DL (ref 11.7–15.7)
IMM GRANULOCYTES # BLD: 0 10E3/UL
IMM GRANULOCYTES NFR BLD: 1 %
IRON BINDING CAPACITY (ROCHE): 362 UG/DL (ref 240–430)
IRON SATN MFR SERPL: 22 % (ref 15–46)
IRON SERPL-MCNC: 79 UG/DL (ref 37–145)
LYMPHOCYTES # BLD AUTO: 2.7 10E3/UL (ref 0.8–5.3)
LYMPHOCYTES NFR BLD AUTO: 37 %
MCH RBC QN AUTO: 28.5 PG (ref 26.5–33)
MCHC RBC AUTO-ENTMCNC: 32.8 G/DL (ref 31.5–36.5)
MCV RBC AUTO: 87 FL (ref 78–100)
MONOCYTES # BLD AUTO: 0.6 10E3/UL (ref 0–1.3)
MONOCYTES NFR BLD AUTO: 9 %
NEUTROPHILS # BLD AUTO: 3.1 10E3/UL (ref 1.6–8.3)
NEUTROPHILS NFR BLD AUTO: 43 %
NRBC # BLD AUTO: 0 10E3/UL
NRBC BLD AUTO-RTO: 0 /100
PLATELET # BLD AUTO: 301 10E3/UL (ref 150–450)
POTASSIUM SERPL-SCNC: 4.1 MMOL/L (ref 3.4–5.3)
PROT SERPL-MCNC: 7.4 G/DL (ref 6.4–8.3)
RBC # BLD AUTO: 4.7 10E6/UL (ref 3.8–5.2)
SODIUM SERPL-SCNC: 140 MMOL/L (ref 136–145)
TSH SERPL DL<=0.005 MIU/L-ACNC: 1.95 UIU/ML (ref 0.3–4.2)
WBC # BLD AUTO: 7.1 10E3/UL (ref 4–11)

## 2023-07-25 PROCEDURE — 36415 COLL VENOUS BLD VENIPUNCTURE: CPT | Performed by: DERMATOLOGY

## 2023-07-25 PROCEDURE — 85025 COMPLETE CBC W/AUTO DIFF WBC: CPT | Performed by: DERMATOLOGY

## 2023-07-25 PROCEDURE — 82306 VITAMIN D 25 HYDROXY: CPT | Performed by: DERMATOLOGY

## 2023-07-25 PROCEDURE — 80053 COMPREHEN METABOLIC PANEL: CPT | Performed by: DERMATOLOGY

## 2023-07-25 PROCEDURE — 82728 ASSAY OF FERRITIN: CPT | Performed by: DERMATOLOGY

## 2023-07-25 PROCEDURE — 99214 OFFICE O/P EST MOD 30 MIN: CPT | Mod: 25 | Performed by: DERMATOLOGY

## 2023-07-25 PROCEDURE — 83550 IRON BINDING TEST: CPT | Performed by: DERMATOLOGY

## 2023-07-25 PROCEDURE — 84443 ASSAY THYROID STIM HORMONE: CPT | Performed by: DERMATOLOGY

## 2023-07-25 PROCEDURE — 83540 ASSAY OF IRON: CPT | Performed by: DERMATOLOGY

## 2023-07-25 PROCEDURE — 11901 INJECT SKIN LESIONS >7: CPT | Performed by: DERMATOLOGY

## 2023-07-25 RX ORDER — CLOBETASOL PROPIONATE 0.05 G/100ML
SHAMPOO TOPICAL
Qty: 118 ML | Refills: 3 | Status: SHIPPED | OUTPATIENT
Start: 2023-07-25 | End: 2023-09-22

## 2023-07-25 NOTE — NURSING NOTE
Garry Ferreira's goals for this visit include:   Chief Complaint   Patient presents with    Hair Loss     Patient here for ILK injections       She requests these members of her care team be copied on today's visit information:     PCP: No Ref-Primary, Physician    Referring Provider:  No referring provider defined for this encounter.    There were no vitals taken for this visit.    Do you need any medication refills at today's visit?         Porsche June EMT

## 2023-07-25 NOTE — PATIENT INSTRUCTIONS
Intralesional Kenalog (ILK) Injections    Intralesional steroid injection involves a corticosteroid, such as triamcinolone acetonide (brand name Kenalog), which is injected directly into a lesion on or immediately below the skin. Intralesional kenalog may be used to treat the following skin conditions:    Alopecia areata  Discoid lupus erythematosus  Keloids/hypertrophic scars  Granuloma annulare and other granulomatous disorders  Hypertrophic lichen planus  Lichen simplex chronicus (neurodermatitis)  Localised psoriasis  Necrobiosis lipoidica  Acne cysts (nodulocystic acne)  Small infantile hemangiomas    Possible side-effects of intralesional Kenalog (ILK) injections include bleeding, pain, infection, contact dermatitis, nerve damage, scar formation and need for a repeat procedure.    Some people may experience delayed side-effects including:  Lipoatrophy, appearing as skin indentations or dimples around the injection sites a few weeks after treatment; these may be permanent.  White marks (leukoderma) or brown marks (postinflammatory pigmentation) at the site of injection or spreading from the site of injection - these may resolve or persist long term.  Telangiectasia, or small dilated blood vessels at the site of injection.   Increased hair growth at the site of injection - this resolves eventually.  Steroid acne: steroids increase growth hormone, leading to increased sebum (oil) production by the sebaceous glands. Steroid acne generally improves once the steroid has been stopped.      Who should I call with questions?  [facility]: [phone]   [facility]: [phone]  For urgent needs outside of business hours call the [facility] at [phone] and ask for the dermatology resident on call

## 2023-07-25 NOTE — LETTER
7/25/2023         RE: Garry Ferreira  [address] [address]        Dear Colleague,    Thank you for referring your patient, Garry Ferreira, to the [facility]. Please see a copy of my visit note below.    [facility] Dermatology Note  Encounter Date: Jul 25, 2023  Office Visit     Dermatology Problem List:  1.  Alopecia areata. Prior: tofacitinib 5mg BID (continued to progress), augmented betamethasone. S/p prednisone taper starting at 40mg tapered over 6 weeks (initiated 5/2021), increased from 5mg every day to 10mg daily 9/22/22, slow taper to discontinuation by 6/2023.  - Current tx: Minoxidil 2.5mg daily, lidex solution daily, IL-K injections q6-8w, ketoconazole shampoo, latisse prn, IL-K injections q6-8w prn to active areas  -ILK- 7/25/23  ____________________________________________     Assessment & Plan:        #Alopecia areata. Chronic, not at goal. Not currently using ketoconazole shampoo. Discussed treating with ILK injections. Pt is agreeable to the procedure. Focal dime sized patches on the scalp are noted on exam today.   -Continue minoxidil 2.5mg (1 full tablet) daily  -Continue lidex solution daily.   -See ILK procedure.   -Recommend using ketoconazole shampoo every other day.   Continue Latisse for eyebrows as needed.   -Future considerations: starting clobex shampoo, different  ESTELLE inhibitor   -Labs ordered: TSH with reflex T4, CBC with Diff, ferritin, zinc, and Vitamin D.    -BP:124/87    Procedures Performed:   - Intra-lesional triamcinolone procedure note. After verbal consent and review of risk of pain and skin thinning/atrophy, positioning and cleansing with isopropyl alcohol, 0.4 total mL of triamcinolone 10 mg/mL was injected into 30 site(s) in a grid-like pattern on the scalp. The patient tolerated the procedure well and left the dermatology clinic in good condition.      Follow-up: 2 month(s) in-person, or earlier for  new or changing lesions    Staff and Scribe:     Scribe Disclosure:   I, Lakisha Hayes, am serving as a scribe to document services personally performed by this physician, Dr. Sandhya Melvin, based on data collection and the provider's statements to me.     Provider Disclosure:   The documentation recorded by the scribe accurately reflects the services I personally performed and the decisions made by me. ILK  njections were done by me.    Sandhya Melvin MD  Professor and Chair  Department of Dermatology  [facility] [place] [facility] [place]: Phone: [phone], Fax:[phone]  [facility]: Phone: [phone], Fax: [phone]      ____________________________________________    CC: Hair Loss (Patient here for ILK injections)    HPI:  Ms. Garry Ferreira is a 23 year old female who presents as a return patient for follow-up of hair loss, diagnosed as alopecia areata.   - Last seen on 6/8/23 by Dr. Brown and a resident.   - Shedding or thinning, or both: shedding   - Current tx: Minoxidil 2.5mg daily, lidex solution daily, IL-K injections q6-8w, ketoconazole shampoo, latisse prn, IL-K injections q6-8w prn to active areas  - Scalp or hair care habits/products: Lidex every night before bed (bottle lasts 1 month), reports not using ketoconazole shampoo,   no Any new medications, supplements, or products? (please list below)     no Scalp pain   no Scalp burning   no Scalp itching    no Eyebrow changes    no Eyelash changes   no Other body hair changes    no Nail changes    no Additional symptoms? (please list below)     - Overall course: chronic, not at goal    Patient is otherwise feeling well, in usual state of health, and has no additional skin concerns today.     Labs:  None.     Physical Exam:  GEN: Well developed, well-nourished, in no acute distress, in a pleasant mood.    SKIN: Focused examination of scalp,  face and hands was performed.  SCALP -,   -focal dime-sized patches of alopecia   - no diffuse erythema   - no perifollicular erythema  - no perifollicular scale   - no scaling of the scalp   - positive hair pull test on the right side   - no scalp folliculitis/pustules   - In comparison to prior photographs, there are focal dime sized patches on the scalp     FACE -  -  normal eyelash density  -  normal eyebrow density  HANDS -  -  no nail pitting or dystrophy   - No other lesions of concern on areas examined.     Medications:  Current Outpatient Medications   Medication     augmented betamethasone dipropionate (DIPROLENE-AF) 0.05 % external cream     augmented betamethasone dipropionate (DIPROLENE-AF) 0.05 % external ointment     clobetasol (TEMOVATE) 0.05 % external cream     clobetasol propionate (CLOBEX) 0.05 % external shampoo     fluocinonide (LIDEX) 0.05 % external solution     ketoconazole (NIZORAL) 2 % external shampoo     minoxidil (LONITEN) 2.5 MG tablet     NONFORMULARY     norgestimate-ethinyl estradiol (ORTHO-CYCLEN) 0.25-35 MG-MCG tablet     Ruxolitinib Phosphate (OPZELURA) 1.5 % CREA     tofacitinib (XELJANZ) 5 MG PO tablet     Current Facility-Administered Medications   Medication     triamcinolone acetonide (KENALOG-10) injection 20 mg     triamcinolone acetonide (KENALOG-10) injection 40 mg     triamcinolone acetonide (KENALOG-10) injection 40 mg     triamcinolone acetonide (KENALOG-10) injection 40 mg     triamcinolone acetonide (KENALOG-10) injection 40 mg     triamcinolone acetonide (KENALOG-10) injection 40 mg     triamcinolone acetonide (KENALOG-10) injection 40 mg     triamcinolone acetonide (KENALOG-10) injection 40 mg     triamcinolone acetonide (KENALOG-10) injection 40 mg     triamcinolone acetonide (KENALOG-10) injection 40 mg     triamcinolone acetonide (KENALOG-10) injection 46 mg      Past Medical History:   Patient Active Problem List   Diagnosis     Alopecia areata     Medication  monitoring encounter     No past medical history on file.    CC No referring provider defined for this encounter. on close of this encounter.      Again, thank you for allowing me to participate in the care of your patient.        Sincerely,        Sandhya Melvin MD

## 2023-08-11 ENCOUNTER — TRANSCRIBE ORDERS (OUTPATIENT)
Dept: OTHER | Age: 23
End: 2023-08-11

## 2023-08-11 DIAGNOSIS — L63.9 ALOPECIA AREATA: Primary | ICD-10-CM

## 2023-09-22 ENCOUNTER — MYC MEDICAL ADVICE (OUTPATIENT)
Dept: DERMATOLOGY | Facility: CLINIC | Age: 23
End: 2023-09-22

## 2023-09-22 ENCOUNTER — OFFICE VISIT (OUTPATIENT)
Dept: DERMATOLOGY | Facility: CLINIC | Age: 23
End: 2023-09-22
Payer: COMMERCIAL

## 2023-09-22 VITALS — HEART RATE: 99 BPM | SYSTOLIC BLOOD PRESSURE: 141 MMHG | DIASTOLIC BLOOD PRESSURE: 85 MMHG

## 2023-09-22 DIAGNOSIS — L63.9 ALOPECIA AREATA: Primary | ICD-10-CM

## 2023-09-22 DIAGNOSIS — Z51.81 MEDICATION MONITORING ENCOUNTER: ICD-10-CM

## 2023-09-22 DIAGNOSIS — L65.9 LOSS OF HAIR: ICD-10-CM

## 2023-09-22 DIAGNOSIS — M35.9 AUTOIMMUNE DISEASE (H): ICD-10-CM

## 2023-09-22 DIAGNOSIS — E78.5 ELEVATED FASTING LIPID PROFILE: ICD-10-CM

## 2023-09-22 PROCEDURE — 11901 INJECT SKIN LESIONS >7: CPT | Performed by: DERMATOLOGY

## 2023-09-22 PROCEDURE — 99214 OFFICE O/P EST MOD 30 MIN: CPT | Mod: 25 | Performed by: DERMATOLOGY

## 2023-09-22 RX ORDER — KETOCONAZOLE 20 MG/ML
SHAMPOO TOPICAL
Qty: 120 ML | Refills: 11 | Status: SHIPPED | OUTPATIENT
Start: 2023-09-22

## 2023-09-22 RX ORDER — TRIAMCINOLONE ACETONIDE 1 MG/G
CREAM TOPICAL
Qty: 45 G | Refills: 1 | Status: SHIPPED | OUTPATIENT
Start: 2023-09-22

## 2023-09-22 RX ORDER — MINOXIDIL 2.5 MG/1
2.5 TABLET ORAL DAILY
Qty: 90 TABLET | Refills: 3 | Status: SHIPPED | OUTPATIENT
Start: 2023-09-22

## 2023-09-22 RX ORDER — FLUOCINONIDE TOPICAL SOLUTION USP, 0.05% 0.5 MG/ML
SOLUTION TOPICAL
Qty: 60 ML | Refills: 5 | Status: SHIPPED | OUTPATIENT
Start: 2023-09-22 | End: 2024-03-19

## 2023-09-22 ASSESSMENT — PAIN SCALES - GENERAL: PAINLEVEL: NO PAIN (0)

## 2023-09-22 NOTE — PATIENT INSTRUCTIONS
[place]                           The use of minoxidil by  mouth to grow hair is not FDA approved.   Description and Brand Names ([facility])       Drug information provided by: [employer]    US Brand Name  Haroon Dsouza    Minoxidil belongs to the general class of medicines called antihypertensives. It is used to treat high blood pressure (hypertension).    High blood pressure adds to the workload of the heart and arteries. If it continues for a long time, the heart and arteries may not function properly. This can damage the blood vessels of the brain, heart, and kidneys, resulting in a stroke, heart failure, or kidney failure. High blood pressure may also increase the risk of heart attacks. These problems may be less likely to occur if blood pressure is controlled.    Minoxidil works by relaxing blood vessels so that blood passes through them more easily. This helps to lower blood pressure.    Minoxidil has other effects that could be bothersome for some patients. These include increased hair growth, weight gain, fast heartbeat, and chest pain. Before you take this medicine, be sure that you have discussed the use of it with your doctor.    Minoxidil is being applied to the scalp in liquid form by some balding men to stimulate hair growth. However, improper use of liquids made from minoxidil tablets can result in minoxidil being absorbed into the body, where it may cause unwanted effects on the heart and blood vessels.    Minoxidil is available only with your doctor's prescription.    This product is available in the following dosage forms:    Tablet    Before Using  Drug information provided by: [employer]    In deciding to use a medicine, the risks of taking the medicine must be weighed against the good it will do. This is a decision you and your doctor will make. For this medicine, the following should be considered:    Allergies  Tell your doctor if you have ever had any unusual or allergic  reaction to this medicine or any other medicines. Also tell your health care professional if you have any other types of allergies, such as to foods, dyes, preservatives, or animals. For non-prescription products, read the label or package ingredients carefully.    Pediatric  Although there is no specific information comparing use of minoxidil in children with use in other age groups, this medicine is not expected to cause different side effects or problems in children than it does in adults.    Geriatric  Elderly patients may be more sensitive to the effects of minoxidil. In addition, minoxidil may reduce tolerance to cold temperatures in elderly patients.    Breastfeeding  Studies in women suggest that this medication poses minimal risk to the infant when used during breastfeeding.    Drug Interactions  Although certain medicines should not be used together at all, in other cases two different medicines may be used together even if an interaction might occur. In these cases, your doctor may want to change the dose, or other precautions may be necessary. Tell your healthcare professional if you are taking any other prescription or nonprescription (over-the-counter [OTC]) medicine.    Other Interactions  Certain medicines should not be used at or around the time of eating food or eating certain types of food since interactions may occur. Using alcohol or tobacco with certain medicines may also cause interactions to occur. Discuss with your healthcare professional the use of your medicine with food, alcohol, or tobacco.    Other Medical Problems  The presence of other medical problems may affect the use of this medicine. Make sure you tell your doctor if you have any other medical problems, especially:    Angina (chest pain)--Minoxidil may make this condition worse  Heart attack or stroke (recent)--Lowering blood pressure may make problems resulting from heart attack or stroke worse  Heart or blood vessel  disease--Minoxidil can cause fluid buildup, which can cause problems  Kidney disease--Effects may be increased because of slower removal of minoxidil from the body  Pheochromocytoma--Minoxidil may cause the tumor to be more active    Storage  Store the medicine in a closed container at room temperature, away from heat, moisture, and direct light. Keep from freezing.    Keep out of the reach of children.    Do not keep outdated medicine or medicine no longer needed.    Precautions  Drug information provided by: [employer]    It is important that your doctor check your progress at regular visits to make sure that this medicine is working properly.    Ask your doctor about checking your pulse rate before and after taking minoxidil. Then, while you are taking this medicine, check your pulse regularly while you are resting. If it increases by 20 beats or more a minute, check with your doctor right away.    While you are taking minoxidil, weigh yourself every day. A weight gain of 2 to 3 pounds (about 1 kg) in an adult is normal and should be lost with continued treatment. However, if you suddenly gain 5 pounds (2 kg) or more (for a child, 2 pounds [1 kg] or more) or if you notice swelling of your feet or lower legs, check with your doctor right away.    Do not take other medicines unless they have been discussed with your doctor. This especially includes over-the-counter (nonprescription) medicines for appetite control, asthma, colds, cough, hay fever, or sinus problems, since they may tend to increase your blood pressure.    Side Effects  Drug information provided by: [employer]    Along with its needed effects, a medicine may cause some unwanted effects. Although not all of these side effects may occur, if they do occur they may need medical attention.    Check with your doctor immediately if any of the following side effects occur:    More common  Fast or irregular heartbeat  weight gain (rapid) of more than 5  pounds (2 pounds in children)  Less common  Chest pain  shortness of breath  Check with your doctor as soon as possible if any of the following side effects occur:    More common  Bloating  flushing or redness of skin  swelling of feet or lower legs  Less common  Numbness or tingling of hands, feet, or face  Rare  Skin rash and itching  Some side effects may occur that usually do not need medical attention. These side effects may go away during treatment as your body adjusts to the medicine. Also, your health care professional may be able to tell you about ways to prevent or reduce some of these side effects. Check with your health care professional if any of the following side effects continue or are bothersome or if you have any questions about them:    More common  Increase in hair growth, usually on face, arms, and back  Less common or rare  Breast tenderness in males and females  headache  This medicine causes a temporary increase in hair growth in most people. Hair may grow longer and darker in both men and women. This may first be noticed on the face several weeks after you start taking minoxidil. Later, new hair growth may be noticed on the back, arms, legs, and scalp. Talk to your doctor about shaving or using a hair remover during this time. After treatment with minoxidil has ended, the hair will stop growing, although it may take several months for the new hair growth to go away.    Other side effects not listed may also occur in some patients. If you notice any other effects, check with your healthcare professional.    Call your doctor for medical advice about side effects. You may report side effects to the FDA at 7-738-URN-[phone].

## 2023-09-22 NOTE — LETTER
9/22/2023         RE: Garry Ferreira  [address] [address]        Dear Colleague,    Thank you for referring your patient, Garry Ferreira, to the [facility]. Please see a copy of my visit note below.    [facility] Dermatology Note  Encounter Date: Sep 22, 2023  Office Visit     Dermatology Problem List:  1.  Alopecia areata. Prior: tofacitinib 5mg BID (continued to progress), augmented betamethasone. S/P prednisone taper starting at 40 mg tapered over 6 weeks (initiated 5/2021), increased from 5 mg every day to 10 mg daily 9/22/22, slow taper to discontinuation by 6/2023, latisse PRN  - Current tx: Minoxidil 2.5mg daily, lidex solution daily, IL-K injections q6-8w, ketoconazole shampoo, IL-K injections q6-8w prn to active areas  -ILK- 7/25/23, 9/22/23    ____________________________________________    Assessment & Plan:    #Alopecia areata. Chronic, not at goal, previously used tofacitinib, but hair loss progressed. Discussed treating with ILK injections. Pt is agreeable to the procedure.  - See ILK procedure.   -Continue minoxidil 2.5mg (1 full tablet) daily.  -Continue lidex solution daily as per Dr. Melvin  - Continue ketoconazole shampoo every other day.   - Ordered labs to start possibly start baricitinib. She will read about this.   - For brows, start triamcinolone BID for 1 week, take 1 week off then repeat. Needs breaks for skin thinning    # Seborrheic dermatitis.    - See shampoos above.    Procedures Performed:   - Intra-lesional triamcinolone procedure note. After verbal consent and review of risk of pain and skin thinning/atrophy, positioning and cleansing with isopropyl alcohol, 3 total mL of triamcinolone 2.5 mg/mL and 0.5 ml of  triamcinolone 10 mg/mL was injected into 40 lesion(s) on the scalp. The patient tolerated the procedure well and left the dermatology clinic in good condition.    Follow-up: 2-3 week(s) virtually  (telephone with photos), and with Dr. Knight Q3-4 months with in-person, ILK Q6-8 weeks.    Staff and Scribe:     Scribe Disclosure:   I, Parul Chávez, am serving as a scribe to document services personally performed by Ariana Knight MD based on data collection and the provider's statements to me.     Provider Disclosure:   The documentation recorded by the scribe accurately reflects the services I personally performed and the decisions made by me.    Ariana Knight MD    Department of Dermatology  [facility]: Phone: [phone], Fax:[phone]  [facility]: Phone: [phone], Fax: [phone]   ____________________________________________    CC: Hair Loss (HL - patient is still experiencing hair loss. )    HPI:  Ms. Garry Ferreira is a(n) 23 year old female who presents today as a return patient for ILK. Patient normally sees Dr. Melvin. She is on minoxidil orally, daily Lidex solution, and ketoconazole shampoo. She feels her hair loss is worse.     Patient denies chest pain, palpitations, new cardiac history. Patient is on birth control. Denies lightheadedness or dizziness. Denies leg swelling or facial hair growth that is bothersome    Patient is otherwise feeling well, without additional skin concerns.    Labs Reviewed:  -Labs from 7/25/23 reviewed: TSH with reflex T4, CBC with Diff, ferritin, zinc, and Vitamin D.    Physical Exam:  Vitals: BP (!) 141/85   Pulse 99   SKIN: Focused examination of scalp was performed.  - SALT score is 60%.  - Moderate scaling and erythema of scalp.  - Patchy hair loss.  - Left brow has 1 patch of hair loss.    - No other lesions of concern on areas examined.     Medications:  Current Outpatient Medications   Medication     clobetasol propionate (CLOBEX) 0.05 % external shampoo     clobetasol propionate (CLOBEX) 0.05 % external shampoo     fluocinonide  (LIDEX) 0.05 % external solution     ketoconazole (NIZORAL) 2 % external shampoo     minoxidil (LONITEN) 2.5 MG tablet     NONFORMULARY     norgestimate-ethinyl estradiol (ORTHO-CYCLEN) 0.25-35 MG-MCG tablet     augmented betamethasone dipropionate (DIPROLENE-AF) 0.05 % external cream     augmented betamethasone dipropionate (DIPROLENE-AF) 0.05 % external ointment     clobetasol (TEMOVATE) 0.05 % external cream     Ruxolitinib Phosphate (OPZELURA) 1.5 % CREA     tofacitinib (XELJANZ) 5 MG PO tablet     Current Facility-Administered Medications   Medication     triamcinolone acetonide (KENALOG-10) injection 20 mg     triamcinolone acetonide (KENALOG-10) injection 40 mg     triamcinolone acetonide (KENALOG-10) injection 40 mg     triamcinolone acetonide (KENALOG-10) injection 40 mg     triamcinolone acetonide (KENALOG-10) injection 40 mg     triamcinolone acetonide (KENALOG-10) injection 40 mg     triamcinolone acetonide (KENALOG-10) injection 40 mg     triamcinolone acetonide (KENALOG-10) injection 40 mg     triamcinolone acetonide (KENALOG-10) injection 40 mg     triamcinolone acetonide (KENALOG-10) injection 40 mg     triamcinolone acetonide (KENALOG-10) injection 46 mg      Past Medical History:   Patient Active Problem List   Diagnosis     Alopecia areata     Medication monitoring encounter     No past medical history on file.     CC Roxy MARTINI Earp [facility]  [address] [place] [address] on close of this encounter.      Again, thank you for allowing me to participate in the care of your patient.        Sincerely,        Ariana Knight MD

## 2023-09-22 NOTE — PROGRESS NOTES
[facility] Dermatology Note  Encounter Date: Sep 22, 2023  Office Visit     Dermatology Problem List:  1.  Alopecia areata. Prior: tofacitinib 5mg BID (continued to progress), augmented betamethasone. S/P prednisone taper starting at 40 mg tapered over 6 weeks (initiated 5/2021), increased from 5 mg every day to 10 mg daily 9/22/22, slow taper to discontinuation by 6/2023, latisse PRN  - Current tx: Minoxidil 2.5mg daily, lidex solution daily, IL-K injections q6-8w, ketoconazole shampoo, IL-K injections q6-8w prn to active areas  -ILK- 7/25/23, 9/22/23    ____________________________________________    Assessment & Plan:    #Alopecia areata. Chronic, not at goal, previously used tofacitinib, but hair loss progressed. Discussed treating with ILK injections. Pt is agreeable to the procedure.  - See ILK procedure.   -Continue minoxidil 2.5mg (1 full tablet) daily.  -Continue lidex solution daily as per Dr. Melvin  - Continue ketoconazole shampoo every other day.   - Ordered labs to start possibly start baricitinib. She will read about this.   - For brows, start triamcinolone BID for 1 week, take 1 week off then repeat. Needs breaks for skin thinning    # Seborrheic dermatitis.    - See shampoos above.    Procedures Performed:   - Intra-lesional triamcinolone procedure note. After verbal consent and review of risk of pain and skin thinning/atrophy, positioning and cleansing with isopropyl alcohol, 3 total mL of triamcinolone 2.5 mg/mL and 0.5 ml of  triamcinolone 10 mg/mL was injected into 40 lesion(s) on the scalp. The patient tolerated the procedure well and left the dermatology clinic in good condition.    Follow-up: 2-3 week(s) virtually (telephone with photos), and with Dr. Knight Q3-4 months with in-person, ILK Q6-8 weeks.    Staff and Scribe:     Scribe Disclosure:   IParul, am serving as a scribe to document services personally performed by Ariana Knight MD based on data collection  and the provider's statements to me.     Provider Disclosure:   The documentation recorded by the scribe accurately reflects the services I personally performed and the decisions made by me.    Ariana Knight MD    Department of Dermatology  [facility]: Phone: [phone], Fax:[phone]  [facility]: Phone: [phone], Fax: [phone]   ____________________________________________    CC: Hair Loss (HL - patient is still experiencing hair loss. )    HPI:  Ms. Garry Ferreira is a(n) 23 year old female who presents today as a return patient for ILK. Patient normally sees Dr. Melvin. She is on minoxidil orally, daily Lidex solution, and ketoconazole shampoo. She feels her hair loss is worse.     Patient denies chest pain, palpitations, new cardiac history. Patient is on birth control. Denies lightheadedness or dizziness. Denies leg swelling or facial hair growth that is bothersome    Patient is otherwise feeling well, without additional skin concerns.    Labs Reviewed:  -Labs from 7/25/23 reviewed: TSH with reflex T4, CBC with Diff, ferritin, zinc, and Vitamin D.    Physical Exam:  Vitals: BP (!) 141/85   Pulse 99   SKIN: Focused examination of scalp was performed.  - SALT score is 60%.  - Moderate scaling and erythema of scalp.  - Patchy hair loss.  - Left brow has 1 patch of hair loss.    - No other lesions of concern on areas examined.     Medications:  Current Outpatient Medications   Medication    clobetasol propionate (CLOBEX) 0.05 % external shampoo    clobetasol propionate (CLOBEX) 0.05 % external shampoo    fluocinonide (LIDEX) 0.05 % external solution    ketoconazole (NIZORAL) 2 % external shampoo    minoxidil (LONITEN) 2.5 MG tablet    NONFORMULARY    norgestimate-ethinyl estradiol (ORTHO-CYCLEN) 0.25-35 MG-MCG tablet    augmented betamethasone dipropionate (DIPROLENE-AF)  0.05 % external cream    augmented betamethasone dipropionate (DIPROLENE-AF) 0.05 % external ointment    clobetasol (TEMOVATE) 0.05 % external cream    Ruxolitinib Phosphate (OPZELURA) 1.5 % CREA    tofacitinib (XELJANZ) 5 MG PO tablet     Current Facility-Administered Medications   Medication    triamcinolone acetonide (KENALOG-10) injection 20 mg    triamcinolone acetonide (KENALOG-10) injection 40 mg    triamcinolone acetonide (KENALOG-10) injection 40 mg    triamcinolone acetonide (KENALOG-10) injection 40 mg    triamcinolone acetonide (KENALOG-10) injection 40 mg    triamcinolone acetonide (KENALOG-10) injection 40 mg    triamcinolone acetonide (KENALOG-10) injection 40 mg    triamcinolone acetonide (KENALOG-10) injection 40 mg    triamcinolone acetonide (KENALOG-10) injection 40 mg    triamcinolone acetonide (KENALOG-10) injection 40 mg    triamcinolone acetonide (KENALOG-10) injection 46 mg      Past Medical History:   Patient Active Problem List   Diagnosis    Alopecia areata    Medication monitoring encounter     No past medical history on file.     CC Roxy MARTINI Dain  [facility]  [address] [place] [address] on close of this encounter.

## 2023-09-22 NOTE — NURSING NOTE
Dermatology Rooming Note    Garry Ferreira's goals for this visit include:   Chief Complaint   Patient presents with    Hair Loss     HL - patient is still experiencing hair loss.      Melanie Mendez

## 2023-09-25 NOTE — TELEPHONE ENCOUNTER
Dear team    I sent in the triamcinolone 0.1% cream once daily for 1 week, take 1 week off then repeat.     If she wants latisse that is fine. Please review that it is not FDA approved for brows. It can thin the skin and fat in the area when applied. It can permanently stain the skin. Make sure she has not history of glaucoma or first degree relative with glaucoma. Then okay to script.

## 2023-09-28 DIAGNOSIS — L63.9 ALOPECIA AREATA: Primary | ICD-10-CM

## 2024-01-10 ENCOUNTER — MYC MEDICAL ADVICE (OUTPATIENT)
Dept: DERMATOLOGY | Facility: CLINIC | Age: 24
End: 2024-01-10
Payer: COMMERCIAL

## 2024-01-10 DIAGNOSIS — L63.9 ALOPECIA AREATA: Primary | ICD-10-CM

## 2024-01-11 RX ORDER — BIMATOPROST 3 UG/ML
1 SOLUTION TOPICAL AT BEDTIME
Qty: 5 ML | Refills: 3 | Status: SHIPPED | OUTPATIENT
Start: 2024-01-11 | End: 2024-03-19

## 2024-03-19 ENCOUNTER — OFFICE VISIT (OUTPATIENT)
Dept: DERMATOLOGY | Facility: CLINIC | Age: 24
End: 2024-03-19
Payer: COMMERCIAL

## 2024-03-19 VITALS — HEART RATE: 96 BPM | DIASTOLIC BLOOD PRESSURE: 83 MMHG | SYSTOLIC BLOOD PRESSURE: 126 MMHG

## 2024-03-19 DIAGNOSIS — L65.9 LOSS OF HAIR: ICD-10-CM

## 2024-03-19 DIAGNOSIS — M35.9 AUTOIMMUNE DISEASE (H): ICD-10-CM

## 2024-03-19 DIAGNOSIS — L63.9 ALOPECIA AREATA: Primary | ICD-10-CM

## 2024-03-19 PROCEDURE — 11901 INJECT SKIN LESIONS >7: CPT | Performed by: DERMATOLOGY

## 2024-03-19 PROCEDURE — 99213 OFFICE O/P EST LOW 20 MIN: CPT | Mod: 25 | Performed by: DERMATOLOGY

## 2024-03-19 RX ORDER — BIMATOPROST 3 UG/ML
1 SOLUTION TOPICAL AT BEDTIME
Qty: 5 ML | Refills: 3 | Status: SHIPPED | OUTPATIENT
Start: 2024-03-19 | End: 2024-09-09

## 2024-03-19 RX ORDER — FLUOCINONIDE TOPICAL SOLUTION USP, 0.05% 0.5 MG/ML
SOLUTION TOPICAL
Qty: 60 ML | Refills: 5 | Status: SHIPPED | OUTPATIENT
Start: 2024-03-19

## 2024-03-19 NOTE — LETTER
3/19/2024         RE: Garry Ferreira  [address] [address]        Dear Colleague,    Thank you for referring your patient, Garry Ferreira, to the [facility]. Please see a copy of my visit note below.    [facility] Dermatology Note  Encounter Date: Mar 19, 2024  Office Visit     Dermatology Problem List:  1.   Alopecia areata. Prior: tofacitinib 5mg BID (continued to progress), augmented betamethasone. S/p prednisone taper starting at 40mg tapered over 6 weeks (initiated 5/2021), increased from 5mg every day to 10mg daily 9/22/22, slow taper to discontinuation by 6/2023.  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 Chronic, not at goal. Not currently using ketoconazole shampoo. Discussed treating with ILK injections. Pt is agreeable to the procedure. Focal dime sized patches on the scalp are noted on exam today.   -Continue minoxidil 2.5mg (1 full tablet) daily  -Continue lidex solution decrease from daily to every other day  -See ILK procedure.   -Recommend using ketoconazole shampoo every other day.   Continue Latisse for eyebrows as needed.   -Future considerations: starting clobex shampoo, different  ESTELLE inhibitor   -Labs ordered: TSH with reflex T4, CBC with Diff, ferritin, zinc, and Vitamin D.    -BP:126/83  -SALT score today: 77%  - Hold lidex solution for couple weeks after ILK injection then restart using every other day       Procedures Performed:   Kenalog intralesional injection procedure note: After verbal consent and discussion of risks including but not limited to atrophy, pain, and bruising, time out was performed, the patient underwent positioning and the area was prepped with isopropyl  alcohol, 7.5 mg total of Kenalog 10 mg/cc was injected into 8  scalp sites.  The patient tolerated the procedure well and left the Dermatology clinic in good condition.       Follow-up: Scheduled for 05/14/2024 with Dr. Melvin    Staff and Scribe:     Scribe Disclosure:   I, Karlene Jaramillo, am serving as a scribe; to document services personally performed by Sandhya Melvin MD -based on data collection and the provider's statements to me.     Provider Disclosure:  I agree with above History, Review of Systems, Physical exam and Plan.  I have reviewed the content of the documentation and have edited it as needed. I have personally performed the services documented here and the documentation accurately represents those services and the decisions I have made.   ILK injections were done by me.    Electronically signed by:  Sandhya Melvin MD  Professor and Chair  Department of Dermatology  [facility]     ____________________________________________    CC: No chief complaint on file.    HPI:  Ms. Garry Ferreira is a 24 year old female who presents as a return patient for follow-up of hair loss, diagnosed as alopecia areata  - Last seen 07/25/2023 by me in-clinic, at which time labs was ordered and B/P was 124/87.    Today, she denies any problems with the minoxidil, lidex solution or latisse to eyebrows.   She had some gradual loss, but has noticed some regrowth with blond hair. She has a mild loss on the eyelashes.   Her alopecia areata started years ago. She thinks she has always had alopecia starting in pre-school. Then she started losing more hair during college.   - Current tx: Minoxidil 2.5 mg daily, lidex solution daily, latisse for eyebrow and eyelashes, Ketoconazole shampoo every other day, and ILK injection     No Any new medications, supplements, or products? (please list below)     No Scalp pain   No Scalp burning   No Scalp itching    thinning Eyebrow changes    No Eyelash  changes   NA Blankenship changes    No Other body hair changes    No Nail changes    No Additional symptoms? (please list below)       Patient is otherwise feeling well, in usual state of health, and has no additional skin concerns today.         Labs:  None reviewed.    Physical Exam:  GEN: Well developed, well-nourished, in no acute distress, in a pleasant mood.    SKIN: Focused examination of scalp and face was performed.  - 20% of scalp with absolutely no type of  hair  SALT  Right scalp-17%  Top of head 25%  Left side scalp 15%  Occipital scalp 20%  - mild pink papules 1-2 diameters on the forehead   - In comparison to prior photographs, none.   - No other lesions of concern on areas examined.     Medications:  Current Outpatient Medications   Medication     augmented betamethasone dipropionate (DIPROLENE-AF) 0.05 % external cream     bimatoprost (LATISSE) 0.03 % external opthalmic solution     clobetasol (TEMOVATE) 0.05 % external cream     fluocinonide (LIDEX) 0.05 % external solution     ketoconazole (NIZORAL) 2 % external shampoo     minoxidil (LONITEN) 2.5 MG tablet     NONFORMULARY     norgestimate-ethinyl estradiol (ORTHO-CYCLEN) 0.25-35 MG-MCG tablet     triamcinolone (KENALOG) 0.1 % external cream     Current Facility-Administered Medications   Medication     triamcinolone acetonide (KENALOG-10) injection 20 mg     triamcinolone acetonide (KENALOG-10) injection 40 mg     triamcinolone acetonide (KENALOG-10) injection 40 mg     triamcinolone acetonide (KENALOG-10) injection 40 mg     triamcinolone acetonide (KENALOG-10) injection 40 mg     triamcinolone acetonide (KENALOG-10) injection 40 mg     triamcinolone acetonide (KENALOG-10) injection 40 mg     triamcinolone acetonide (KENALOG-10) injection 40 mg     triamcinolone acetonide (KENALOG-10) injection 40 mg     triamcinolone acetonide (KENALOG-10) injection 46 mg      Past Medical History:   Patient Active Problem List   Diagnosis     Alopecia areata      Medication monitoring encounter     No past medical history on file.    CC No referring provider defined for this encounter. on close of this encounter.       Again, thank you for allowing me to participate in the care of your patient.        Sincerely,        Sandhya Melvin MD

## 2024-03-19 NOTE — PROGRESS NOTES
[facility] Dermatology Note  Encounter Date: Mar 19, 2024  Office Visit     Dermatology Problem List:  1.   Alopecia areata. Prior: tofacitinib 5mg BID (continued to progress), augmented betamethasone. S/p prednisone taper starting at 40mg tapered over 6 weeks (initiated 5/2021), increased from 5mg every day to 10mg daily 9/22/22, slow taper to discontinuation by 6/2023.  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 Chronic, not at goal. Not currently using ketoconazole shampoo. Discussed treating with ILK injections. Pt is agreeable to the procedure. Focal dime sized patches on the scalp are noted on exam today.   -Continue minoxidil 2.5mg (1 full tablet) daily  -Continue lidex solution decrease from daily to every other day  -See ILK procedure.   -Recommend using ketoconazole shampoo every other day.   Continue Latisse for eyebrows as needed.   -Future considerations: starting clobex shampoo, different  ESTELLE inhibitor   -Labs ordered: TSH with reflex T4, CBC with Diff, ferritin, zinc, and Vitamin D.    -BP:126/83  -SALT score today: 77%  - Hold lidex solution for couple weeks after ILK injection then restart using every other day       Procedures Performed:   Kenalog intralesional injection procedure note: After verbal consent and discussion of risks including but not limited to atrophy, pain, and bruising, time out was performed, the patient underwent positioning and the area was prepped with isopropyl alcohol, 7.5 mg total of Kenalog 10 mg/cc was injected into 8  scalp sites.  The patient tolerated the procedure well and left the Dermatology clinic in good condition.       Follow-up: Scheduled for 05/14/2024 with Dr. Melvin    Staff and Scribe:      Scribe Disclosure:   I, Karlene Jaramillo, am serving as a scribe; to document services personally performed by Sandhya Melvin MD -based on data collection and the provider's statements to me.     Provider Disclosure:  I agree with above History, Review of Systems, Physical exam and Plan.  I have reviewed the content of the documentation and have edited it as needed. I have personally performed the services documented here and the documentation accurately represents those services and the decisions I have made.   ILK injections were done by me.    Electronically signed by:  Sandhya Melvin MD  Professor and Chair  Department of Dermatology  [facility]     ____________________________________________    CC: No chief complaint on file.    HPI:  Ms. Garry Ferreira is a 24 year old female who presents as a return patient for follow-up of hair loss, diagnosed as alopecia areata  - Last seen 07/25/2023 by me in-clinic, at which time labs was ordered and B/P was 124/87.    Today, she denies any problems with the minoxidil, lidex solution or latisse to eyebrows.   She had some gradual loss, but has noticed some regrowth with blond hair. She has a mild loss on the eyelashes.   Her alopecia areata started years ago. She thinks she has always had alopecia starting in pre-school. Then she started losing more hair during college.   - Current tx: Minoxidil 2.5 mg daily, lidex solution daily, latisse for eyebrow and eyelashes, Ketoconazole shampoo every other day, and ILK injection     No Any new medications, supplements, or products? (please list below)     No Scalp pain   No Scalp burning   No Scalp itching    thinning Eyebrow changes    No Eyelash changes   NA Beard changes    No Other body hair changes    No Nail changes    No Additional symptoms? (please list below)       Patient is otherwise feeling well, in usual state of health, and has no additional skin concerns today.         Labs:  None  reviewed.    Physical Exam:  GEN: Well developed, well-nourished, in no acute distress, in a pleasant mood.    SKIN: Focused examination of scalp and face was performed.  - 20% of scalp with absolutely no type of  hair  SALT  Right scalp-17%  Top of head 25%  Left side scalp 15%  Occipital scalp 20%  - mild pink papules 1-2 diameters on the forehead   - In comparison to prior photographs, none.   - No other lesions of concern on areas examined.     Medications:  Current Outpatient Medications   Medication    augmented betamethasone dipropionate (DIPROLENE-AF) 0.05 % external cream    bimatoprost (LATISSE) 0.03 % external opthalmic solution    clobetasol (TEMOVATE) 0.05 % external cream    fluocinonide (LIDEX) 0.05 % external solution    ketoconazole (NIZORAL) 2 % external shampoo    minoxidil (LONITEN) 2.5 MG tablet    NONFORMULARY    norgestimate-ethinyl estradiol (ORTHO-CYCLEN) 0.25-35 MG-MCG tablet    triamcinolone (KENALOG) 0.1 % external cream     Current Facility-Administered Medications   Medication    triamcinolone acetonide (KENALOG-10) injection 20 mg    triamcinolone acetonide (KENALOG-10) injection 40 mg    triamcinolone acetonide (KENALOG-10) injection 40 mg    triamcinolone acetonide (KENALOG-10) injection 40 mg    triamcinolone acetonide (KENALOG-10) injection 40 mg    triamcinolone acetonide (KENALOG-10) injection 40 mg    triamcinolone acetonide (KENALOG-10) injection 40 mg    triamcinolone acetonide (KENALOG-10) injection 40 mg    triamcinolone acetonide (KENALOG-10) injection 40 mg    triamcinolone acetonide (KENALOG-10) injection 46 mg      Past Medical History:   Patient Active Problem List   Diagnosis    Alopecia areata    Medication monitoring encounter     No past medical history on file.    CC No referring provider defined for this encounter. on close of this encounter.

## 2024-03-19 NOTE — NURSING NOTE
Garry Ferreira's goals for this visit include:   Chief Complaint   Patient presents with    Hair Loss     Patient is here for a follow up for hair loss.   Patient is experiencing hair loss on top of head since last appointment. Patient is using Latisse, lidex solution and taking Minoxidil.       She requests these members of her care team be copied on today's visit information:     PCP: No Ref-Primary, Physician    Referring Provider:  No referring provider defined for this encounter.    /83 (BP Location: Right arm)   Pulse 96     Do you need any medication refills at today's visit?     Jennifer Zhou on 3/19/2024 at 8:04 AM

## 2024-05-14 ENCOUNTER — OFFICE VISIT (OUTPATIENT)
Dept: DERMATOLOGY | Facility: CLINIC | Age: 24
End: 2024-05-14
Payer: COMMERCIAL

## 2024-05-14 VITALS — DIASTOLIC BLOOD PRESSURE: 83 MMHG | OXYGEN SATURATION: 96 % | SYSTOLIC BLOOD PRESSURE: 124 MMHG | HEART RATE: 64 BPM

## 2024-05-14 DIAGNOSIS — L65.9 LOSS OF HAIR: ICD-10-CM

## 2024-05-14 DIAGNOSIS — M35.9 AUTOIMMUNE DISEASE (H): ICD-10-CM

## 2024-05-14 DIAGNOSIS — L63.9 ALOPECIA AREATA: Primary | ICD-10-CM

## 2024-05-14 PROCEDURE — 11901 INJECT SKIN LESIONS >7: CPT | Performed by: DERMATOLOGY

## 2024-05-14 NOTE — LETTER
5/14/2024         RE: Garry Ferreira  [address] [facility] [address]        Dear Colleague,    Thank you for referring your patient, Garry Ferreira, to the [facility]. Please see a copy of my visit note below.    [facility] Dermatology Note  Encounter Date: May 14, 2024  {kkvisit3:395118}    ***NOT SEEN YET**     Dermatology Problem List:  1.   Alopecia areata. Prior: tofacitinib 5mg BID (continued to progress), augmented betamethasone. S/p prednisone taper starting at 40mg tapered over 6 weeks (initiated 5/2021), increased from 5mg every day to 10mg daily 9/22/22, slow taper to discontinuation by 6/2023.  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  ____________________________________________    Assessment & Plan:     #Alopecia areata. Chronic, not at goal. Not currently using ketoconazole shampoo. Discussed treating with ILK injections. Pt is agreeable to the procedure. Focal dime sized patches on the scalp are noted on exam today.   -Continue minoxidil 2.5mg (1 full tablet) daily  -Continue lidex solution decrease from daily to every other day  -See ILK procedure.   -Recommend using ketoconazole shampoo every other day.   Continue Latisse for eyebrows as needed.   -Future considerations: starting clobex shampoo, different  ESTELLE inhibitor   -Labs ordered: TSH with reflex T4, CBC with Diff, ferritin, zinc, and Vitamin D.    -BP:126/83  -SALT score today: 77%  - Hold lidex solution for couple weeks after ILK injection then restart using every other day   - LPPAI score today: *** (last *** on *** date).   - SALT score today: *** (last 77% on 3/19/2024).  - Hair metrix performed today   - Labs ordered: CBC, CMP, Vit D, TSH, Iron studies (ferritin, iron), androgen studies (free and total testosterone,  DHEAS)       # ***.  {kkplans:998356}   - ***     # ***.   {kkplans:854358}   - ***     Procedures Performed:   {bsproceduresnotes:387183}  {bsproceduresnotes:131296}    Follow-up: {kkfollowup:632096}    Staff and Scribe:     Scribe Disclosure:   I, Karlene Jaramillo, am serving as a scribe; to document services personally performed by Sandhya Melvin MD -based on data collection and the provider's statements to me.     Provider Disclosure:  I agree with above History, Review of Systems, Physical exam and Plan.  I have reviewed the content of the documentation and have edited it as needed. I have personally performed the services documented here and the documentation accurately represents those services and the decisions I have made.      Electronically signed by:  ***   ____________________________________________    CC: No chief complaint on file.    HPI:  Ms. Garry Ferreira is a 24 year old female who presents as a return patient for follow-up of hair loss, diagnosed as alopecia areata.   - Last seen in-clinic on ***  - Shedding or thinning, or both: ***  - Current tx: ***  - If using Rogaine, 1 cannister lasts how long: ***   - Scalp or hair care habits/products: ***  *** Any new medications, supplements, or products? (please list below)     *** Scalp pain   *** Scalp burning   *** Scalp itching    *** Eyebrow changes    *** Eyelash changes   *** Beard changes    *** Other body hair changes    *** Nail changes    *** Additional symptoms? (please list below)     - Overall course: ***  - COVID status: ***yes/no/unknown, ***date(if known)    Patient is otherwise feeling well, in usual state of health, and has no additional skin concerns today.       {kkROSoptional:662894}    Labs:  {kkreviewedlabs:760993} reviewed.    Physical Exam:  Vitals: There were no vitals taken for this visit.  GEN: Well developed, well-nourished, in no acute distress, in a pleasant mood.    SKIN: {kkSkEC[facility] Hospitalam:917251}  - Paula  type: ***  - Maulik part width of ***  - The layers of hair regrowth layers were noted to be  - *** cm for the first  - *** cm at the second  - *** cm at the third   - *** cm at the fourth   - *** diffuse erythema   - *** perifollicular erythema  - *** perifollicular scale   - *** scaling of the scalp   - *** negative hair pull test   - *** normal eyelash density  - *** normal eyebrow density  - *** no nail pitting or dystrophy   - *** scalp folliculitis/pustules   - In comparison to prior photographs, ***  - {Skin Exam Derm:348878}.   - No other lesions of concern on areas examined.     ***If FFA:  - R lateral forehead vein: elevated***/neutral***/depressed***  - Midline forehead vein: elevated***/neutral***/depressed***  - L ateral forehead vein: elevated***/neutral***/depressed***  - *** facial papules   - *** measurement of lateral canthus to lateral hairline   - *** measurement nasal bridge to anterior hairline    Medications:  Current Outpatient Medications   Medication Sig Dispense Refill     augmented betamethasone dipropionate (DIPROLENE-AF) 0.05 % external cream Apply topically 2 times daily To affected areas on scalp (Patient not taking: Reported on 9/22/2023) 50 g 4     bimatoprost (LATISSE) 0.03 % external opthalmic solution Apply 1 drop topically at bedtime 5 mL 3     clobetasol (TEMOVATE) 0.05 % external cream Apply topically 2 times daily To new patches in addition to the surrounding half-inch of hair (Patient not taking: Reported on 9/22/2023) 60 g 1     fluocinonide (LIDEX) 0.05 % external solution Apply to affected areas of scalp every other day 60 mL 5     ketoconazole (NIZORAL) 2 % external shampoo Apply topically three times a week 120 mL 11     minoxidil (LONITEN) 2.5 MG tablet Take 1 tablet (2.5 mg) by mouth daily 90 tablet 3     NONFORMULARY Please dispense one scalp prosthesis for every day use on scalp 1 each 11     norgestimate-ethinyl estradiol (ORTHO-CYCLEN) 0.25-35 MG-MCG tablet Take 1  tablet by mouth daily       triamcinolone (KENALOG) 0.1 % external cream Use twice daily for 1 week, take 1 week off, then repeat on brows 45 g 1     Current Facility-Administered Medications   Medication Dose Route Frequency Provider Last Rate Last Admin     triamcinolone acetonide (KENALOG-10) injection 20 mg  20 mg Intra-Lesional Once Sandhya Melvin MD         triamcinolone acetonide (KENALOG-10) injection 40 mg  40 mg Intra-Lesional Once Sandhya Melvin MD         triamcinolone acetonide (KENALOG-10) injection 40 mg  40 mg Intra-Lesional Once Sandhya Melvin MD         triamcinolone acetonide (KENALOG-10) injection 40 mg  40 mg Intra-Lesional Once Sandhya Melvin MD         triamcinolone acetonide (KENALOG-10) injection 40 mg  40 mg Intra-Lesional Once JOAQUIN Marquez MD         triamcinolone acetonide (KENALOG-10) injection 40 mg  40 mg Intra-Lesional Once Sandhya Melvin MD         triamcinolone acetonide (KENALOG-10) injection 40 mg  40 mg Intra-Lesional Once Sandhya Melvin MD         triamcinolone acetonide (KENALOG-10) injection 40 mg  40 mg Intra-Lesional Once Sandhya Melvin MD         triamcinolone acetonide (KENALOG-10) injection 40 mg  40 mg Intra-Lesional Once Sandhya Melvin MD         triamcinolone acetonide (KENALOG-10) injection 46 mg  46 mg Intra-Lesional Once Sandhya Melvin MD          Past Medical History:   Patient Active Problem List   Diagnosis     Alopecia areata     Medication monitoring encounter     No past medical history on file.    CC No referring provider defined for this encounter. on close of this encounter.       Again, thank you for allowing me to participate in the care of your patient.        Sincerely,        Sandhya Melvin MD

## 2024-05-14 NOTE — PROGRESS NOTES
[facility] Dermatology Note  Encounter Date: May 14, 2024  Office Visit       Dermatology Problem List:  1.   Alopecia areata. Prior: tofacitinib 5mg BID (continued to progress), augmented betamethasone. S/p prednisone taper starting at 40mg tapered over 6 weeks (initiated 5/2021), increased from 5mg every day to 10mg daily 9/22/22, slow taper to discontinuation by 6/2023.  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  ____________________________________________    Assessment & Plan:     #Alopecia areata. Chronic, not at goal. Not currently using ketoconazole shampoo. Discussed treating with ILK injections. Pt is agreeable to the procedure. Focal dime sized patches on the scalp are noted on exam today.   - Continue minoxidil 2.5mg (1 full tablet) daily  - Continue lidex solution every other day  - See ILK procedure.   - Continue ketoconazole shampoo every other day.   - Continue Latisse for eyebrows as needed.   - Future considerations: starting clobex shampoo, different  ESTELLE inhibitor   - B/P: 124/83  - Hold lidex solution for couple weeks after ILK injection then restart using every other day             Procedures Performed:     Kenalog intralesional injection procedure note: After verbal consent and discussion of risks including but not limited to atrophy, pain, and bruising, time out was performed, the patient underwent positioning and the area was prepped with isopropyl alcohol, 2 cc total of Kenalog 10 mg/cc was injected into 20  sites at top of scalp  The patient tolerated the procedure well and left the Dermatology clinic in good condition.       Follow-up: 1 month(s) in-person, or earlier for new or changing lesions    Staff and Scribe:     Scribe Disclosure:   Karlene YROK  Ella, am serving as a scribe; to document services personally performed by Sandhya Melvin MD -based on data collection and the provider's statements to me.     Provider Disclosure:  I agree with above History, Review of Systems, Physical exam and Plan.  I have reviewed the content of the documentation and have edited it as needed. I have personally performed the services documented here and the documentation accurately represents those services and the decisions I have made.      Electronically signed by:  Sandhya Melvin MD  Professor   Department of Dermatology  [facility]    ____________________________________________    CC: Hair Loss (Patient is here for ILK injection )    HPI:  Ms. Garry Ferreira is a 24 year old female who presents as a return patient for follow-up of hair loss, diagnosed as alopecia areata.   - Last seen in-clinic on 3/19/2024  - Shedding or thinning, or both: none  - Current tx: minoxidil 2.5 mg daily   no Any new medications, supplements, or products? (please list below)     no Scalp pain   no Scalp burning   no Scalp itching    no Eyebrow changes    no Eyelash changes   no Beard changes    no Other body hair changes    no Nail changes    no Additional symptoms? (please list below)     - Overall course: she has had history extensive hair growth during covid    Patient is otherwise feeling well, in usual state of health, and has no additional skin concerns today.       Labs:  None reviewed.    Physical Exam:  Vitals: There were no vitals taken for this visit.  GEN: Well developed, well-nourished, in no acute distress, in a pleasant mood.    SKIN: Scalp face and hand exam was performed.-   - no diffuse erythema   - no perifollicular erythema  - no perifollicular scale   - no scaling of the scalp   -  negative hair pull test   - normal eyelash density  - normal eyebrow density  - no nail pitting or dystrophy   - no scalp folliculitis/pustules   - In comparison to  prior photographs, yes 3/19/2024-hair is longer with some areas of hair growth  - No other lesions of concern on areas examined.     Medications:  Current Outpatient Medications   Medication Sig Dispense Refill    bimatoprost (LATISSE) 0.03 % external opthalmic solution Apply 1 drop topically at bedtime 5 mL 3    fluocinonide (LIDEX) 0.05 % external solution Apply to affected areas of scalp every other day 60 mL 5    ketoconazole (NIZORAL) 2 % external shampoo Apply topically three times a week 120 mL 11    minoxidil (LONITEN) 2.5 MG tablet Take 1 tablet (2.5 mg) by mouth daily 90 tablet 3    NONFORMULARY Please dispense one scalp prosthesis for every day use on scalp 1 each 11    norgestimate-ethinyl estradiol (ORTHO-CYCLEN) 0.25-35 MG-MCG tablet Take 1 tablet by mouth daily      triamcinolone (KENALOG) 0.1 % external cream Use twice daily for 1 week, take 1 week off, then repeat on brows 45 g 1    augmented betamethasone dipropionate (DIPROLENE-AF) 0.05 % external cream Apply topically 2 times daily To affected areas on scalp (Patient not taking: Reported on 9/22/2023) 50 g 4    clobetasol (TEMOVATE) 0.05 % external cream Apply topically 2 times daily To new patches in addition to the surrounding half-inch of hair (Patient not taking: Reported on 9/22/2023) 60 g 1     Current Facility-Administered Medications   Medication Dose Route Frequency Provider Last Rate Last Admin    triamcinolone acetonide (KENALOG-10) injection 20 mg  20 mg Intra-Lesional Once Sandhya Melvin MD        triamcinolone acetonide (KENALOG-10) injection 40 mg  40 mg Intra-Lesional Once Sandhya Melvin MD        triamcinolone acetonide (KENALOG-10) injection 40 mg  40 mg Intra-Lesional Once Sandhya Melvin MD        triamcinolone acetonide (KENALOG-10) injection 40 mg  40 mg Intra-Lesional Once Sandhya Melvin MD        triamcinolone acetonide (KENALOG-10) injection 40 mg  40 mg  Intra-Lesional Once JOAQUIN Marquez MD        triamcinolone acetonide (KENALOG-10) injection 40 mg  40 mg Intra-Lesional Once Sandhya Melvin MD        triamcinolone acetonide (KENALOG-10) injection 40 mg  40 mg Intra-Lesional Once Sandhya Melvin MD        triamcinolone acetonide (KENALOG-10) injection 40 mg  40 mg Intra-Lesional Once Sandhya Melvin MD        triamcinolone acetonide (KENALOG-10) injection 40 mg  40 mg Intra-Lesional Once Sandhya Melvin MD        triamcinolone acetonide (KENALOG-10) injection 46 mg  46 mg Intra-Lesional Once Sandhya Melvin MD          Past Medical History:   Patient Active Problem List   Diagnosis    Alopecia areata    Medication monitoring encounter     No past medical history on file.    CC No referring provider defined for this encounter. on close of this encounter.

## 2024-05-14 NOTE — NURSING NOTE
Garry Ferreira's goals for this visit include:   Chief Complaint   Patient presents with    Hair Loss     Patient is here for ILK injection        She requests these members of her care team be copied on today's visit information:     PCP: No Ref-Primary, Physician    Referring Provider:  Sandhya Melvin MD  [address] [place] [address]    There were no vitals taken for this visit.    Do you need any medication refills at today's visit?         Porsceh June EMT

## 2024-07-30 ENCOUNTER — TELEPHONE (OUTPATIENT)
Dept: DERMATOLOGY | Facility: CLINIC | Age: 24
End: 2024-07-30
Payer: COMMERCIAL

## 2024-07-30 NOTE — TELEPHONE ENCOUNTER
Called the patient and rescheduled her appointment to Feb 25th. Writer also offered appointments with other providers and patient wanted to stick with .       Porsche HOLDER

## 2024-07-30 NOTE — TELEPHONE ENCOUNTER
[facility] Call Center    Phone Message    May a detailed message be left on voicemail: yes     Reason for Call: Other: Patient is needing to reschedule her 8/27/24 injection. Patient is wondering if she has to wait until 04/2025 will she be able to refill her medications? Please call patient back to discuss. Thanks.    Action Taken: te sent    Travel Screening: Not Applicable     Date of Service:

## 2024-09-09 DIAGNOSIS — L63.9 ALOPECIA AREATA: ICD-10-CM

## 2024-09-09 RX ORDER — BIMATOPROST 3 UG/ML
1 SOLUTION TOPICAL AT BEDTIME
Qty: 5 ML | Refills: 3 | Status: SHIPPED | OUTPATIENT
Start: 2024-09-09

## 2024-09-09 NOTE — TELEPHONE ENCOUNTER
BIMATOPROST 0.03% EYELASH SOLN       Last Written Prescription Date:  3-19-24  Last Fill Quantity: 5 ml,   # refills: 3  Last Office Visit : 5-14-24  Future Office visit:  2-25-25    Routing refill request to provider for review/approval because:  Med not on derm protocol

## 2024-10-28 DIAGNOSIS — L65.9 LOSS OF HAIR: ICD-10-CM

## 2024-10-31 NOTE — TELEPHONE ENCOUNTER
Medication Requested:   Disp Refills Start End GREGORY   minoxidil (LONITEN) 2.5 MG tablet 90 tablet 3 9/22/2023 -- No   Sig - Route: Take 1 tablet (2.5 mg) by mouth daily - Oral     ----------------------  Last Office Visit : 5/14/2024  [facility] Office visit:     2/25/2025 7:40 AM (20 min)  Karen   Arrive by:  7:25 AM   RETURN [facility]IRLOSS   [facility] ([place])   Sandhya Melvin MD     ----------------------        Refill decision: Refill pended and routed to the provider for review/determination due to the following criteria not met:     Medication not on [facility], [facility], [facility] refill protocol

## 2024-11-02 RX ORDER — MINOXIDIL 2.5 MG/1
2.5 TABLET ORAL DAILY
Qty: 90 TABLET | Refills: 2 | Status: SHIPPED | OUTPATIENT
Start: 2024-11-02

## 2024-12-01 ENCOUNTER — HEALTH MAINTENANCE LETTER (OUTPATIENT)
Age: 24
End: 2024-12-01

## 2025-02-06 DIAGNOSIS — L63.9 ALOPECIA AREATA: ICD-10-CM

## 2025-02-10 NOTE — TELEPHONE ENCOUNTER
Last Written Prescription:  triamcinolone (KENALOG) 0.1 % external cream             Summary: Use twice daily for 1 week, take 1 week off, then repeat on brows Disp-45 g, R-1 E-Prescribe  Start: 09/22/2023Ord/Sold: 09/22/2023 (O)Ordered On: 09/22/2023Pharmacy: [id number] - [place], [place] - [address] [address]: Taking: Long-term: Med Note:              Patient Sig: Use twice daily for 1 week, take 1 week off, then repeat on brows  Ordering Department:  DER[facility]  Authorized By: Ariana Knight MD  Dispense: 45 g  Refills: 1 ordered       ----------------------  Last Visit Date: 5/14/24 Olegario   Future Visit Date: 2/17/25  ----------------------  Refill DECISION: failed, Med not on med list at last visit 5/14/24, Clarification needed    Request from pharmacy:  Requested Prescriptions   Pending Prescriptions Disp Refills    triamcinolone (KENALOG) 0.1 % external cream [Pharmacy Med Name: TRIAMCINOLONE 0.1% CREAM] 45 g 1     Sig: USE TWICE DAILY FOR 1 WEEK, TAKE 1 WEEK OFF, THEN REPEAT ON BROWS       Topical Steroids and Nonsteroidals Protocol Passed - 2/10/2025  2:45 PM        Passed - Patient is age 6 or older        Passed - Authorizing prescriber's most recent note related to this medication read.     If refill request is for ophthalmic use, please forward request to provider for approval.          Passed - High potency steroid not ordered        Passed - Recent (12 mo) or future (30 days) visit within the authorizing provider's specialty     The patient must have completed an in-person or virtual visit within the past 12 months or has a future visit scheduled within the next 90 days with the authorizing provider s specialty.  Urgent care and e-visits do not qualify as an office visit for this protocol.          Passed - Medication is active on med list         Dulce EARL RN  P Central Nursing/Red Flag Triage & Med Refill Team

## 2025-02-10 NOTE — PROGRESS NOTES
[facility] Dermatology Note  Encounter Date: Feb 17, 2025  Office Visit     Reviewed patients past medical history and pertinent chart review prior to patients visit today.     Dermatology Problem List:  1.   Alopecia areata. Prior: tofacitinib 5mg BID (continued to progress), augmented betamethasone. S/p prednisone taper starting at 40mg tapered over 6 weeks (initiated 5/2021), increased from 5mg every day to 10mg daily 9/22/22, slow taper to discontinuation by 6/2023, latisse   - Current tx: Minoxidil 2.5mg daily, lidex solution daily, IL-K injections q6-8w, ketoconazole shampoo, triamcinolone cream  - ILK- 7/25/23, 03/19/2024, 2/17/2025  - SALT Score: 91%     ____________________________________________    Assessment & Plan:     #Alopecia areata. Chronic, not at goal. Not currently using ketoconazole shampoo. Discussed treating with ILK injections. Pt is agreeable to the procedure. Focal dime sized patches on the scalp are noted on exam today.   - Continue minoxidil 2.5mg (1 full tablet) daily. Patient is on oral birth control pills.   -Restart Lidex solution.  Patient to apply this 1-2 times per weekend  - See ILK procedure.   - Continue ketoconazole shampoo every other day.   -Decrease frequency of triamcinolone cream to eyebrows to every other night.  - Future considerations: starting clobex shampoo, different  ESTELLE inhibitor     Procedures Performed:   - Intra-lesional triamcinolone procedure note. After verbal consent and review of risk of pain and skin thinning/atrophy, positioning and cleansing with isopropyl alcohol, 2 total mL of triamcinolone 10 mg/mL was injected into 25 lesion(s) on the scalp. The patient tolerated the procedure well and left the dermatology clinic in good condition.    Follow up: We every 8 weeks for IL K injections and with Dr. Dawna cavazos on 11/11/2025 for reevaluation of medication management    Blanche SALMERON [facility]  Dermatology     _______________________________________    CC: No chief complaint on file.    HPI:  Ms. Garry Ferreira is a(n) 25 year old female who presents today as a return patient for follow-up of alopecia areata.  The patient was last seen at [facility] dermatology on 5/14/2024 at which time Alcaine injections were performed.  Since then, patient has not noticed much improvement in the hair on the scalp.  She has noticed improvement of the bilateral eyebrows.  She is currently using ketoconazole cream to the bilateral eyebrows once nightly.  She did try Latisse to the eyebrow area but became very irritated on her eyelids from this.  She uses ketoconazole shampoo on the scalp every other day.  She currently takes oral minoxidil 2.5 mg once daily.  She denies lower extremity edema, unwanted hair growth, chest pain or shortness of breath.  She was also prescribed Lidex solution but stopped using this daily as she did not like how often she was needing to change her sheets.  She denies itching, burning or erythema on the scalp.    Patient is otherwise feeling well, without additional skin concerns.      Physical Exam:  SKIN: Focused examination of scalp and eyebrows was performed.  -Fine hair growth involving the bilateral eyebrows  -Few areas of hair growth on the frontal and parietal scalp   Right: 18%  left: 18%  Top: 36%   Back: 19%   SALT score: 91%                          - No other lesions of concern on areas examined.     Medications:  Current Outpatient Medications   Medication Sig Dispense Refill    augmented betamethasone dipropionate (DIPROLENE-AF) 0.05 % external cream Apply topically 2 times daily To affected areas on scalp (Patient not taking: Reported on 9/22/2023) 50 g 4    bimatoprost (LATISSE) 0.03 % external opthalmic solution APPLY 1 DROP TOPICALLY AT BEDTIME 5 mL 3    clobetasol (TEMOVATE) 0.05 % external cream Apply topically 2 times daily To new patches in addition to the surrounding half-inch  of hair (Patient not taking: Reported on 9/22/2023) 60 g 1    fluocinonide (LIDEX) 0.05 % external solution Apply to affected areas of scalp every other day 60 mL 5    ketoconazole (NIZORAL) 2 % external shampoo Apply topically three times a week 120 mL 11    minoxidil (LONITEN) 2.5 MG tablet Take 1 tablet (2.5 mg) by mouth daily. 90 tablet 2    NONFORMULARY Please dispense one scalp prosthesis for everyday use on scalp. 1 each 0    NONFORMULARY Please dispense one scalp prosthesis for every day use on scalp 1 each 11    norgestimate-ethinyl estradiol (ORTHO-CYCLEN) 0.25-35 MG-MCG tablet Take 1 tablet by mouth daily      triamcinolone (KENALOG) 0.1 % external cream Use twice daily for 1 week, take 1 week off, then repeat on brows 45 g 1     Current Facility-Administered Medications   Medication Dose Route Frequency Provider Last Rate Last Admin    triamcinolone acetonide (KENALOG-10) injection 20 mg  20 mg Intra-Lesional Once Sandhya Melvin MD        triamcinolone acetonide (KENALOG-10) injection 40 mg  40 mg Intra-Lesional Once Sandhya Melvin MD        triamcinolone acetonide (KENALOG-10) injection 40 mg  40 mg Intra-Lesional Once Sandhya Melvin MD        triamcinolone acetonide (KENALOG-10) injection 40 mg  40 mg Intra-Lesional Once Sandhya Melvin MD        triamcinolone acetonide (KENALOG-10) injection 40 mg  40 mg Intra-Lesional Once JOAQUIN Marquez MD        triamcinolone acetonide (KENALOG-10) injection 40 mg  40 mg Intra-Lesional Once Sandhya Melvin MD        triamcinolone acetonide (KENALOG-10) injection 40 mg  40 mg Intra-Lesional Once Sandhya Melvin MD        triamcinolone acetonide (KENALOG-10) injection 40 mg  40 mg Intra-Lesional Once Sandhya Melvin MD        triamcinolone acetonide (KENALOG-10) injection 40 mg  40 mg Intra-Lesional Once Sandhya Melvinczuk, MD        triamcinolone acetonide  (KENALOG-10) injection 46 mg  46 mg Intra-Lesional Once Sandhya Melvin MD          Past Medical History:   Patient Active Problem List   Diagnosis    Alopecia areata    Medication monitoring encounter     No past medical history on file.    CC Referred Self, MD  No address on file on close of this encounter.

## 2025-02-11 RX ORDER — TRIAMCINOLONE ACETONIDE 1 MG/G
CREAM TOPICAL
Qty: 45 G | Refills: 1 | OUTPATIENT
Start: 2025-02-11

## 2025-02-17 ENCOUNTER — OFFICE VISIT (OUTPATIENT)
Dept: DERMATOLOGY | Facility: CLINIC | Age: 25
End: 2025-02-17
Payer: COMMERCIAL

## 2025-02-17 DIAGNOSIS — L65.9 LOSS OF HAIR: ICD-10-CM

## 2025-02-17 DIAGNOSIS — L63.9 ALOPECIA AREATA: ICD-10-CM

## 2025-02-17 RX ORDER — FLUOCINONIDE TOPICAL SOLUTION USP, 0.05% 0.5 MG/ML
SOLUTION TOPICAL
Qty: 60 ML | Refills: 5 | Status: SHIPPED | OUTPATIENT
Start: 2025-02-17

## 2025-02-17 RX ORDER — KETOCONAZOLE 20 MG/ML
SHAMPOO, SUSPENSION TOPICAL
Qty: 120 ML | Refills: 11 | Status: SHIPPED | OUTPATIENT
Start: 2025-02-17

## 2025-02-17 RX ORDER — MINOXIDIL 2.5 MG/1
2.5 TABLET ORAL DAILY
Qty: 90 TABLET | Refills: 2 | Status: SHIPPED | OUTPATIENT
Start: 2025-02-17

## 2025-02-17 RX ORDER — TRIAMCINOLONE ACETONIDE 1 MG/G
CREAM TOPICAL
Qty: 45 G | Refills: 5 | Status: SHIPPED | OUTPATIENT
Start: 2025-02-17

## 2025-02-17 ASSESSMENT — PAIN SCALES - GENERAL: PAINLEVEL_OUTOF10: NO PAIN (0)

## 2025-02-17 NOTE — NURSING NOTE
Garry Ferreira's chief complaint for this visit includes:  Chief Complaint   Patient presents with    Hair Loss     Ilk injections. Not noticing hair growth.      PCP: No Ref-Primary, Physician    Referring Provider:  Referred Self, MD  No address on file    There were no vitals taken for this visit.  No Pain (0)      No Known Allergies      Do you need any medication refills at today's visit?   Yes- pended.       Hermila Weems RN on 2/17/2025 at 2:24 PM

## 2025-02-17 NOTE — LETTER
2/17/2025      Garry Ferreira  [address] [address]      Dear Colleague,    Thank you for referring your patient, Garry Ferreira, to the [facility]. Please see a copy of my visit note below.    [facility] Dermatology Note  Encounter Date: Feb 17, 2025  Office Visit     Reviewed patients past medical history and pertinent chart review prior to patients visit today.     Dermatology Problem List:  1.   Alopecia areata. Prior: tofacitinib 5mg BID (continued to progress), augmented betamethasone. S/p prednisone taper starting at 40mg tapered over 6 weeks (initiated 5/2021), increased from 5mg every day to 10mg daily 9/22/22, slow taper to discontinuation by 6/2023, latisse   - Current tx: Minoxidil 2.5mg daily, lidex solution daily, IL-K injections q6-8w, ketoconazole shampoo, triamcinolone cream  - ILK- 7/25/23, 03/19/2024, 2/17/2025  - SALT Score: 91%   ____________________________________________    Assessment & Plan:     #Alopecia areata. Chronic, not at goal. Not currently using ketoconazole shampoo. Discussed treating with ILK injections. Pt is agreeable to the procedure. Focal dime sized patches on the scalp are noted on exam today.   - Continue minoxidil 2.5mg (1 full tablet) daily. Patient is on oral birth control pills.   -Restart Lidex solution.  Patient to apply this 1-2 times per weekend  - See ILK procedure.   - Continue ketoconazole shampoo every other day.   -Decrease frequency of triamcinolone cream to eyebrows to every other night.  - Future considerations: starting clobex shampoo, different  ESTELLE inhibitor     Procedures Performed:   - Intra-lesional triamcinolone procedure note. After verbal consent and review of risk of pain and skin thinning/atrophy, positioning and cleansing with isopropyl alcohol, 2 total mL of triamcinolone 10 mg/mL was injected into 25 lesion(s) on the scalp. The patient tolerated the procedure well and left  the dermatology clinic in good condition.    Follow up: We every 8 weeks for IL K injections and with Dr. Dawna cavazos on 11/11/2025 for reevaluation of medication management    Blanche Soliz PA-C  [facility]  Dermatology    _______________________________________    CC: No chief complaint on file.    HPI:  Ms. Garry Ferreira is a(n) 25 year old female who presents today as a return patient for follow-up of alopecia areata.  The patient was last seen at [facility] dermatology on 5/14/2024 at which time Alcaine injections were performed.  Since then, patient has not noticed much improvement in the hair on the scalp.  She has noticed improvement of the bilateral eyebrows.  She is currently using ketoconazole cream to the bilateral eyebrows once nightly.  She did try Latisse to the eyebrow area but became very irritated on her eyelids from this.  She uses ketoconazole shampoo on the scalp every other day.  She currently takes oral minoxidil 2.5 mg once daily.  She denies lower extremity edema, unwanted hair growth, chest pain or shortness of breath.  She was also prescribed Lidex solution but stopped using this daily as she did not like how often she was needing to change her sheets.  She denies itching, burning or erythema on the scalp.    Patient is otherwise feeling well, without additional skin concerns.      Physical Exam:  SKIN: Focused examination of scalp and eyebrows was performed.  -Fine hair growth involving the bilateral eyebrows  -Few areas of hair growth on the frontal and parietal scalp   Right: 18%  left: 18%  Top: 36%   Back: 19%   SALT score: 91%                        - No other lesions of concern on areas examined.     Medications:  Current Outpatient Medications   Medication Sig Dispense Refill    augmented betamethasone dipropionate (DIPROLENE-AF) 0.05 % external cream Apply topically 2 times daily To affected areas on scalp (Patient not taking: Reported on 9/22/2023) 50 g 4     bimatoprost (LATISSE) 0.03 % external opthalmic solution APPLY 1 DROP TOPICALLY AT BEDTIME 5 mL 3    clobetasol (TEMOVATE) 0.05 % external cream Apply topically 2 times daily To new patches in addition to the surrounding half-inch of hair (Patient not taking: Reported on 9/22/2023) 60 g 1    fluocinonide (LIDEX) 0.05 % external solution Apply to affected areas of scalp every other day 60 mL 5    ketoconazole (NIZORAL) 2 % external shampoo Apply topically three times a week 120 mL 11    minoxidil (LONITEN) 2.5 MG tablet Take 1 tablet (2.5 mg) by mouth daily. 90 tablet 2    NONFORMULARY Please dispense one scalp prosthesis for everyday use on scalp. 1 each 0    NONFORMULARY Please dispense one scalp prosthesis for every day use on scalp 1 each 11    norgestimate-ethinyl estradiol (ORTHO-CYCLEN) 0.25-35 MG-MCG tablet Take 1 tablet by mouth daily      triamcinolone (KENALOG) 0.1 % external cream Use twice daily for 1 week, take 1 week off, then repeat on brows 45 g 1     Current Facility-Administered Medications   Medication Dose Route Frequency Provider Last Rate Last Admin    triamcinolone acetonide (KENALOG-10) injection 20 mg  20 mg Intra-Lesional Once Sandhya Melvin MD        triamcinolone acetonide (KENALOG-10) injection 40 mg  40 mg Intra-Lesional Once Sandhya Melvin MD        triamcinolone acetonide (KENALOG-10) injection 40 mg  40 mg Intra-Lesional Once Sandhya Melvin MD        triamcinolone acetonide (KENALOG-10) injection 40 mg  40 mg Intra-Lesional Once Sandhya Melvin MD        triamcinolone acetonide (KENALOG-10) injection 40 mg  40 mg Intra-Lesional Once JOAQUIN Marquez MD        triamcinolone acetonide (KENALOG-10) injection 40 mg  40 mg Intra-Lesional Once Sandhya Melvin MD        triamcinolone acetonide (KENALOG-10) injection 40 mg  40 mg Intra-Lesional Once Sandhya Melvin MD        triamcinolone acetonide  (KENALOG-10) injection 40 mg  40 mg Intra-Lesional Once Sandhya Melvin MD        triamcinolone acetonide (KENALOG-10) injection 40 mg  40 mg Intra-Lesional Once Sandhya Melvin MD        triamcinolone acetonide (KENALOG-10) injection 46 mg  46 mg Intra-Lesional Once Sandhya Melvin MD          Past Medical History:   Patient Active Problem List   Diagnosis    Alopecia areata    Medication monitoring encounter     No past medical history on file.    CC Referred Self, MD  No address on file on close of this encounter.     Again, thank you for allowing me to participate in the care of your patient.      Sincerely,    Blanche Soliz PA-C  Electronically signed

## 2025-04-02 NOTE — PROGRESS NOTES
[facility] Dermatology Note  Encounter Date: Apr 14, 2025  Office Visit     Reviewed patients past medical history and pertinent chart review prior to patients visit today.     Dermatology Problem List:    1.   Alopecia areata. Prior: tofacitinib 5mg BID (continued to progress), augmented betamethasone. S/p prednisone taper starting at 40mg tapered over 6 weeks (initiated 5/2021), increased from 5mg every day to 10mg daily 9/22/22, slow taper to discontinuation by 6/2023, latisse   - Current tx: Minoxidil 2.5mg daily, lidex 1-2x weekly, ILK injections q6-8w, triamcinolone cream, ketoconazole shampoo  - ILK- 7/25/23, 03/19/2024, 2/17/2025  - SALT Score: 91%   ____________________________________________    Assessment & Plan:     #Alopecia areata. Chronic, not at goal. Not currently using ketoconazole shampoo. Discussed treating with ILK injections. Pt is agreeable to the procedure. Focal dime sized patches on the scalp are noted on exam today.   - Continue minoxidil 2.5mg (1 full tablet) daily. Patient is on oral birth control pills.   -Restart Lidex solution.  Patient to apply this 1-2 times per weekend  - See ILK procedure.   - Continue ketoconazole shampoo every other day.   -Decrease frequency of triamcinolone cream to eyebrows to every other night.  - Future considerations: starting clobex shampoo, different  ESTELLE inhibitor     Procedures Performed:   - Intra-lesional triamcinolone procedure note. After verbal consent and review of risk of pain and skin thinning/atrophy, positioning and cleansing with isopropyl alcohol, 2 total mL of triamcinolone 10 mg/mL was injected into 20 lesion(s) on the scalp. The patient tolerated the procedure well and left the dermatology clinic in good condition.       Follow up: June 2025 for next injections    Blanche Soliz PA-C  [facility]  Dermatology    _______________________________________    CC: No chief complaint on file.    HPI:  Ms. Castañeda  Hanna is a(n) 25 year old female who presents today as a return patient for follow-up of alopecia areata.  Patient was seen last in health.  Dermatology in 2/19/2025 at which time alk injections were performed on the scalp.  Over the past couple months, patient has noticed a improvement in her hair growth.  She has continued with oral minoxidil 2.5 mg daily, topical Lidex solution 1-2 times per week and and triamcinolone cream to eyebrows every other night.  She continues with the ketoconazole shampoo every other day.  Overall content with this plan.  No other concerns    Patient is otherwise feeling well, without additional skin concerns.      Physical Exam:  SKIN: Focused examination of scalp was performed.  - Fine hair growth involving the bilateral eyebrows  - Fine hair growth and tyshawn of hair involving the bilateral temporal/parietal scalp and occipital scalp                        - No other lesions of concern on areas examined.     Medications:  Current Outpatient Medications   Medication Sig Dispense Refill    fluocinonide (LIDEX) 0.05 % external solution Apply to affected areas of scalp 1-2x per weekend 60 mL 5    ketoconazole (NIZORAL) 2 % external shampoo Use to wash scalp every other day. Let lather and sit for 5 minutes prior to rinsing 120 mL 11    minoxidil (LONITEN) 2.5 MG tablet Take 1 tablet (2.5 mg) by mouth daily. 90 tablet 2    NONFORMULARY Please dispense one scalp prosthesis for everyday use on scalp. 1 each 0    NONFORMULARY Please dispense one scalp prosthesis for every day use on scalp 1 each 11    norgestimate-ethinyl estradiol (ORTHO-CYCLEN) 0.25-35 MG-MCG tablet Take 1 tablet by mouth daily      triamcinolone (KENALOG) 0.1 % external cream Use every other night on eyebrows 45 g 5     Current Facility-Administered Medications   Medication Dose Route Frequency Provider Last Rate Last Admin    triamcinolone acetonide (KENALOG-10) injection 20 mg  20 mg Intra-Lesional Once Olegario  Sandhya Roland MD        triamcinolone acetonide (KENALOG-10) injection 40 mg  40 mg Intra-Lesional Once Sandhya Melvin MD        triamcinolone acetonide (KENALOG-10) injection 40 mg  40 mg Intra-Lesional Once Sandhya Melvin MD        triamcinolone acetonide (KENALOG-10) injection 40 mg  40 mg Intra-Lesional Once Sandhya Melvin MD        triamcinolone acetonide (KENALOG-10) injection 40 mg  40 mg Intra-Lesional Once JOAQUIN Marquez MD        triamcinolone acetonide (KENALOG-10) injection 40 mg  40 mg Intra-Lesional Once Sandhya Melvin MD        triamcinolone acetonide (KENALOG-10) injection 40 mg  40 mg Intra-Lesional Once Sandhya Melvin MD        triamcinolone acetonide (KENALOG-10) injection 40 mg  40 mg Intra-Lesional Once Sandhya Melvin MD        triamcinolone acetonide (KENALOG-10) injection 40 mg  40 mg Intra-Lesional Once Sandhya Melvin MD        triamcinolone acetonide (KENALOG-10) injection 46 mg  46 mg Intra-Lesional Once Sandhya Melvin MD          Past Medical History:   Patient Active Problem List   Diagnosis    Alopecia areata    Medication monitoring encounter     No past medical history on file.    CC No referring provider defined for this encounter. on close of this encounter.

## 2025-04-02 NOTE — PATIENT INSTRUCTIONS
Intralesional Kenalog (ILK) Injections    Intralesional steroid injection involves a corticosteroid, such as triamcinolone acetonide (brand name Kenalog), which is injected directly into a lesion on or immediately below the skin. Intralesional kenalog may be used to treat many skin conditions:    Alopecia areata  Discoid lupus erythematosus  Keloids/hypertrophic scars  Granuloma annulare and other granulomatous disorders  Hypertrophic lichen planus  Lichen simplex chronicus (neurodermatitis)  Localised psoriasis  Necrobiosis lipoidica  Acne cysts (nodulocystic acne)  Small infantile hemangiomas    Possible side-effects of intralesional Kenalog (ILK) injections include bleeding, pain, skin thinning,infection, contact dermatitis, nerve damage, scar formation and need for a repeat procedure.    Some people may experience delayed side-effects including:  Lipoatrophy, appearing as skin indentations or dimples around the injection sites a few weeks after treatment; these may be permanent.  White marks (leukoderma) or brown marks (postinflammatory pigmentation) at the site of injection or spreading from the site of injection - these may resolve or persist long term.  Telangiectasia, or small dilated blood vessels at the site of injection.   Increased hair growth at the site of injection - this resolves eventually.  Steroid acne: steroids increase growth hormone, leading to increased sebum (oil) production by the sebaceous glands. Steroid acne generally improves once the steroid has been stopped.      Who should I call with questions?  [facility]: [phone]   [facility]: [phone]  For urgent needs outside of business hours call the [facility] at [phone] and ask for the dermatology resident on call

## 2025-04-14 ENCOUNTER — OFFICE VISIT (OUTPATIENT)
Dept: DERMATOLOGY | Facility: CLINIC | Age: 25
End: 2025-04-14
Payer: COMMERCIAL

## 2025-04-14 DIAGNOSIS — L63.9 ALOPECIA AREATA: Primary | ICD-10-CM

## 2025-04-14 ASSESSMENT — PAIN SCALES - GENERAL: PAINLEVEL_OUTOF10: NO PAIN (0)

## 2025-04-14 NOTE — NURSING NOTE
Drug Administration Record    Prior to injection, verified patient identity using patient's name and date of birth.  Due to injection administration, patient instructed to remain in clinic for 15 minutes  afterwards, and to report any adverse reaction to me immediately.    Drug Name: triamcinolone acetonide(kenalog)  Dose: 20 mg  Route administered: intralesional  NDC #: 5183-8027-28  Amount of waste(mL):none  Reason for waste: n/a    LOT #: [id number]  SITE: scalp  : [employer]  EXPIRATION DATE: 05/31/2027    Administered by NOLAN Vasquez [facility]

## 2025-04-14 NOTE — LETTER
4/14/2025      Garry Ferreira  [address] [facility] [address]      Dear Colleague,    Thank you for referring your patient, Garry Ferreira, to the [facility]. Please see a copy of my visit note below.    [facility] Dermatology Note  Encounter Date: Apr 14, 2025  Office Visit     Reviewed patients past medical history and pertinent chart review prior to patients visit today.     Dermatology Problem List:    1.   Alopecia areata. Prior: tofacitinib 5mg BID (continued to progress), augmented betamethasone. S/p prednisone taper starting at 40mg tapered over 6 weeks (initiated 5/2021), increased from 5mg every day to 10mg daily 9/22/22, slow taper to discontinuation by 6/2023, latisse   - Current tx: Minoxidil 2.5mg daily, lidex 1-2x weekly, ILK injections q6-8w, triamcinolone cream, ketoconazole shampoo  - ILK- 7/25/23, 03/19/2024, 2/17/2025  - SALT Score: 91%   ____________________________________________    Assessment & Plan:     #Alopecia areata. Chronic, not at goal. Not currently using ketoconazole shampoo. Discussed treating with ILK injections. Pt is agreeable to the procedure. Focal dime sized patches on the scalp are noted on exam today.   - Continue minoxidil 2.5mg (1 full tablet) daily. Patient is on oral birth control pills.   -Restart Lidex solution.  Patient to apply this 1-2 times per weekend  - See ILK procedure.   - Continue ketoconazole shampoo every other day.   -Decrease frequency of triamcinolone cream to eyebrows to every other night.  - Future considerations: starting clobex shampoo, different  ESTELLE inhibitor     Procedures Performed:   - Intra-lesional triamcinolone procedure note. After verbal consent and review of risk of pain and skin thinning/atrophy, positioning and cleansing with isopropyl alcohol, 2 total mL of triamcinolone 10 mg/mL was injected into 20 lesion(s) on the scalp. The patient tolerated the procedure well and left the  dermatology clinic in good condition.       Follow up: June 2025 for next injections    Blanche Soliz PA-C  [facility]  Dermatology    _______________________________________    CC: No chief complaint on file.    HPI:  Ms. Garry Ferreira is a(n) 25 year old female who presents today as a return patient for follow-up of alopecia areata.  Patient was seen last in health.  Dermatology in 2/19/2025 at which time alk injections were performed on the scalp.  Over the past couple months, patient has noticed a improvement in her hair growth.  She has continued with oral minoxidil 2.5 mg daily, topical Lidex solution 1-2 times per week and and triamcinolone cream to eyebrows every other night.  She continues with the ketoconazole shampoo every other day.  Overall content with this plan.  No other concerns    Patient is otherwise feeling well, without additional skin concerns.      Physical Exam:  SKIN: Focused examination of scalp was performed.  - Fine hair growth involving the bilateral eyebrows  - Fine hair growth and tyshawn of hair involving the bilateral temporal/parietal scalp and occipital scalp                        - No other lesions of concern on areas examined.     Medications:  Current Outpatient Medications   Medication Sig Dispense Refill     fluocinonide (LIDEX) 0.05 % external solution Apply to affected areas of scalp 1-2x per weekend 60 mL 5     ketoconazole (NIZORAL) 2 % external shampoo Use to wash scalp every other day. Let lather and sit for 5 minutes prior to rinsing 120 mL 11     minoxidil (LONITEN) 2.5 MG tablet Take 1 tablet (2.5 mg) by mouth daily. 90 tablet 2     NONFORMULARY Please dispense one scalp prosthesis for everyday use on scalp. 1 each 0     NONFORMULARY Please dispense one scalp prosthesis for every day use on scalp 1 each 11     norgestimate-ethinyl estradiol (ORTHO-CYCLEN) 0.25-35 MG-MCG tablet Take 1 tablet by mouth daily       triamcinolone (KENALOG) 0.1 % external cream  Use every other night on eyebrows 45 g 5     Current Facility-Administered Medications   Medication Dose Route Frequency Provider Last Rate Last Admin     triamcinolone acetonide (KENALOG-10) injection 20 mg  20 mg Intra-Lesional Once Sandhya Melvin MD         triamcinolone acetonide (KENALOG-10) injection 40 mg  40 mg Intra-Lesional Once Sandhya Melvin MD         triamcinolone acetonide (KENALOG-10) injection 40 mg  40 mg Intra-Lesional Once Sandhya Melvin MD         triamcinolone acetonide (KENALOG-10) injection 40 mg  40 mg Intra-Lesional Once Sandhya Melvin MD         triamcinolone acetonide (KENALOG-10) injection 40 mg  40 mg Intra-Lesional Once JOAQUIN Marquez MD         triamcinolone acetonide (KENALOG-10) injection 40 mg  40 mg Intra-Lesional Once Sandhya Melvin MD         triamcinolone acetonide (KENALOG-10) injection 40 mg  40 mg Intra-Lesional Once Sandhya Melvin MD         triamcinolone acetonide (KENALOG-10) injection 40 mg  40 mg Intra-Lesional Once Sandhya Melvin MD         triamcinolone acetonide (KENALOG-10) injection 40 mg  40 mg Intra-Lesional Once Sandhya Melvin MD         triamcinolone acetonide (KENALOG-10) injection 46 mg  46 mg Intra-Lesional Once Sandhya Melvin MD          Past Medical History:   Patient Active Problem List   Diagnosis     Alopecia areata     Medication monitoring encounter     No past medical history on file.    CC No referring provider defined for this encounter. on close of this encounter.       Again, thank you for allowing me to participate in the care of your patient.        Sincerely,        Blanche Soliz PA-C    Electronically signed

## 2025-04-14 NOTE — NURSING NOTE
Garry Ferreira's chief complaint for this visit includes:  Chief Complaint   Patient presents with    Hair Loss     ILK 10 injections     PCP: No Ref-Primary, Physician    Referring Provider:  Referred Self, MD  No address on file    There were no vitals taken for this visit.  No Pain (0)      No Known Allergies      Do you need any medication refills at today's visit? No  Betty Zhang CMA

## 2025-05-23 NOTE — PATIENT INSTRUCTIONS
Intralesional Kenalog (ILK) Injections    Intralesional steroid injection involves a corticosteroid, such as triamcinolone acetonide (brand name Kenalog), which is injected directly into a lesion on or immediately below the skin. Intralesional kenalog may be used to treat many skin conditions:    Alopecia areata  Discoid lupus erythematosus  Keloids/hypertrophic scars  Granuloma annulare and other granulomatous disorders  Hypertrophic lichen planus  Lichen simplex chronicus (neurodermatitis)  Localised psoriasis  Necrobiosis lipoidica  Acne cysts (nodulocystic acne)  Small infantile hemangiomas    Possible side-effects of intralesional Kenalog (ILK) injections include bleeding, pain, skin thinning,infection, contact dermatitis, nerve damage, scar formation and need for a repeat procedure.    Some people may experience delayed side-effects including:  Lipoatrophy, appearing as skin indentations or dimples around the injection sites a few weeks after treatment; these may be permanent.  White marks (leukoderma) or brown marks (postinflammatory pigmentation) at the site of injection or spreading from the site of injection - these may resolve or persist long term.  Telangiectasia, or small dilated blood vessels at the site of injection.   Increased hair growth at the site of injection - this resolves eventually.  Steroid acne: steroids increase growth hormone, leading to increased sebum (oil) production by the sebaceous glands. Steroid acne generally improves once the steroid has been stopped.      Who should I call with questions?  [facility]: [phone]   [facility]: [phone]  For urgent needs outside of business hours call the [facility] at [phone] and ask for the dermatology resident on call

## 2025-05-23 NOTE — PROGRESS NOTES
[facility] Dermatology Note  Encounter Date: Jun 11, 2025  Office Visit     Reviewed patients past medical history and pertinent chart review prior to patients visit today.     Dermatology Problem List:    1.   Alopecia areata. Prior: tofacitinib 5mg BID (continued to progress), augmented betamethasone. S/p prednisone taper starting at 40mg tapered over 6 weeks (initiated 5/2021), increased from 5mg every day to 10mg daily 9/22/22, slow taper to discontinuation by 6/2023, latisse   - Current tx: Minoxidil 2.5mg daily, lidex 1-2x weekly, ILK injections q6-8w, triamcinolone cream, ketoconazole shampoo  - ILK- 7/25/23, 03/19/2024, 2/17/2025, 4/14/2025, 6/11/2025        ____________________________________________    Assessment & Plan:     #Alopecia areata. Chronic, not at goal. Not currently using ketoconazole shampoo. Discussed treating with ILK injections. Pt is agreeable to the procedure.   - Continue minoxidil 2.5mg (1 full tablet) daily. Patient is on oral birth control pills.   - Continue applying Lidex solution to scalp every other day.  - See ILK procedure.   - Continue ketoconazole shampoo every other day.   - Continue applying triamcinolone cream to eyebrows every other night  - Future considerations: starting clobex shampoo, different  ESTELLE inhibitor    Procedures Performed:   - Intra-lesional triamcinolone procedure note. After verbal consent and review of risk of pain and skin thinning/atrophy, positioning and cleansing with isopropyl alcohol, 2 total mL of triamcinolone 10 mg/mL was injected into 25 lesion(s) on the scalp. The patient tolerated the procedure well and left the dermatology clinic in good condition.    Follow up: August 2025 for next ILK injections     Blanche Soliz PA-C  [facility]  Dermatology    _______________________________________    CC: Hair Loss (Ilk injections- about the same as last time. )    HPI:  Ms. Garry Ferreira is a(n) 25 year old female who  presents today as a return patient for intralesional kenalog injections of the scalp.  Since her last set of injections in April her hair loss has been fairly stable.  She continues with oral minoxidil daily, applying Lidex solution to the scalp every other day, applying triamcinolone cream to the eyebrows every other night and using ketoconazole shampoo every other day.  She has no concerns.    Patient is otherwise feeling well, without additional skin concerns.      Physical Exam:  SKIN: Focused examination of scalp was performed.  -Fine hair growth and tyshawn of hair involving the bilateral temporal/parietal scalp and occipital scalp     - No other lesions of concern on areas examined.     Medications:  Current Outpatient Medications   Medication Sig Dispense Refill    fluocinonide (LIDEX) 0.05 % external solution Apply to affected areas of scalp 1-2x per weekend 60 mL 5    ketoconazole (NIZORAL) 2 % external shampoo Use to wash scalp every other day. Let lather and sit for 5 minutes prior to rinsing 120 mL 11    minoxidil (LONITEN) 2.5 MG tablet Take 1 tablet (2.5 mg) by mouth daily. 90 tablet 2    NONFORMULARY Please dispense one scalp prosthesis for everyday use on scalp. 1 each 0    NONFORMULARY Please dispense one scalp prosthesis for every day use on scalp 1 each 11    norgestimate-ethinyl estradiol (ORTHO-CYCLEN) 0.25-35 MG-MCG tablet Take 1 tablet by mouth daily      triamcinolone (KENALOG) 0.1 % external cream Use every other night on eyebrows 45 g 5     Current Facility-Administered Medications   Medication Dose Route Frequency Provider Last Rate Last Admin    triamcinolone acetonide (KENALOG-10) injection 20 mg  20 mg Intra-Lesional Once Sandhya Melvin MD        triamcinolone acetonide (KENALOG-10) injection 40 mg  40 mg Intra-Lesional Once Sandhya Melvin MD        triamcinolone acetonide (KENALOG-10) injection 40 mg  40 mg Intra-Lesional Once Sandhya Melvin MD         triamcinolone acetonide (KENALOG-10) injection 40 mg  40 mg Intra-Lesional Once Sandhya Melvin MD        triamcinolone acetonide (KENALOG-10) injection 40 mg  40 mg Intra-Lesional Once JOAQUIN Marquez MD        triamcinolone acetonide (KENALOG-10) injection 40 mg  40 mg Intra-Lesional Once Sandhya Melvin MD        triamcinolone acetonide (KENALOG-10) injection 40 mg  40 mg Intra-Lesional Once Sandhya Melvin MD        triamcinolone acetonide (KENALOG-10) injection 40 mg  40 mg Intra-Lesional Once Sandhya Melvin MD        triamcinolone acetonide (KENALOG-10) injection 40 mg  40 mg Intra-Lesional Once Sandhya Melvin MD        triamcinolone acetonide (KENALOG-10) injection 46 mg  46 mg Intra-Lesional Once Sandhya Melvin MD          Past Medical History:   Patient Active Problem List   Diagnosis    Alopecia areata    Medication monitoring encounter     No past medical history on file.    CC Referred Self, MD  No address on file on close of this encounter.

## 2025-06-11 ENCOUNTER — OFFICE VISIT (OUTPATIENT)
Dept: DERMATOLOGY | Facility: CLINIC | Age: 25
End: 2025-06-11
Payer: COMMERCIAL

## 2025-06-11 DIAGNOSIS — L63.9 ALOPECIA AREATA: Primary | ICD-10-CM

## 2025-06-11 ASSESSMENT — PAIN SCALES - GENERAL: PAINLEVEL_OUTOF10: NO PAIN (0)

## 2025-06-11 NOTE — NURSING NOTE
Garry Ferreira's chief complaint for this visit includes:  Chief Complaint   Patient presents with    Hair Loss     Ilk injections- about the same as last time.      PCP: No Ref-Primary, Physician    Referring Provider:  Referred Self, MD  No address on file    There were no vitals taken for this visit.  No Pain (0)      No Known Allergies      Do you need any medication refills at today's visit?   No.      Hermila Weems RN on 6/11/2025 at 3:16 PM

## 2025-06-11 NOTE — NURSING NOTE
Drug Administration Record    Prior to injection, verified patient identity using patient's name and date of birth.  Due to injection administration, patient instructed to remain in clinic for 15 minutes  afterwards, and to report any adverse reaction to me immediately.    Drug Name: triamcinolone acetonide(kenalog)  Dose: 2 mL of triamcinolone 10mg/mL, 10 mg dose  Route administered: intralesional   NDC #: 8551-2153-68  Amount of waste(mL):See procedure note  Reason for waste: Multi dose vial    LOT #: [id number]  SITE: see procedure note  : [email or web address]  EXPIRATION DATE: 09-      Hermila Weems RN on 6/11/2025 at 3:45 PM

## 2025-06-11 NOTE — LETTER
6/11/2025      Garry Ferreira  [address] [address]      Dear Colleague,    Thank you for referring your patient, Garry Ferreira, to the [facility]. Please see a copy of my visit note below.    [facility] Dermatology Note  Encounter Date: Jun 11, 2025  Office Visit     Reviewed patients past medical history and pertinent chart review prior to patients visit today.     Dermatology Problem List:    1.   Alopecia areata. Prior: tofacitinib 5mg BID (continued to progress), augmented betamethasone. S/p prednisone taper starting at 40mg tapered over 6 weeks (initiated 5/2021), increased from 5mg every day to 10mg daily 9/22/22, slow taper to discontinuation by 6/2023, latisse   - Current tx: Minoxidil 2.5mg daily, lidex 1-2x weekly, ILK injections q6-8w, triamcinolone cream, ketoconazole shampoo  - ILK- 7/25/23, 03/19/2024, 2/17/2025, 4/14/2025, 6/11/2025        ____________________________________________    Assessment & Plan:     #Alopecia areata. Chronic, not at goal. Not currently using ketoconazole shampoo. Discussed treating with ILK injections. Pt is agreeable to the procedure.   - Continue minoxidil 2.5mg (1 full tablet) daily. Patient is on oral birth control pills.   - Continue applying Lidex solution to scalp every other day.  - See ILK procedure.   - Continue ketoconazole shampoo every other day.   - Continue applying triamcinolone cream to eyebrows every other night  - Future considerations: starting clobex shampoo, different  ESTELLE inhibitor    Procedures Performed:   - Intra-lesional triamcinolone procedure note. After verbal consent and review of risk of pain and skin thinning/atrophy, positioning and cleansing with isopropyl alcohol, 2 total mL of triamcinolone 10 mg/mL was injected into 25 lesion(s) on the scalp. The patient tolerated the procedure well and left the dermatology clinic in good condition.    Follow up: August 2025 for  next ILK injections     Blanche Soliz PA-C  [facility]  Dermatology    _______________________________________    CC: Hair Loss (Ilk injections- about the same as last time. )    HPI:  Ms. Garry Ferreira is a(n) 25 year old female who presents today as a return patient for intralesional kenalog injections of the scalp.  Since her last set of injections in April her hair loss has been fairly stable.  She continues with oral minoxidil daily, applying Lidex solution to the scalp every other day, applying triamcinolone cream to the eyebrows every other night and using ketoconazole shampoo every other day.  She has no concerns.    Patient is otherwise feeling well, without additional skin concerns.      Physical Exam:  SKIN: Focused examination of scalp was performed.  -Fine hair growth and tyshawn of hair involving the bilateral temporal/parietal scalp and occipital scalp     - No other lesions of concern on areas examined.     Medications:  Current Outpatient Medications   Medication Sig Dispense Refill     fluocinonide (LIDEX) 0.05 % external solution Apply to affected areas of scalp 1-2x per weekend 60 mL 5     ketoconazole (NIZORAL) 2 % external shampoo Use to wash scalp every other day. Let lather and sit for 5 minutes prior to rinsing 120 mL 11     minoxidil (LONITEN) 2.5 MG tablet Take 1 tablet (2.5 mg) by mouth daily. 90 tablet 2     NONFORMULARY Please dispense one scalp prosthesis for everyday use on scalp. 1 each 0     NONFORMULARY Please dispense one scalp prosthesis for every day use on scalp 1 each 11     norgestimate-ethinyl estradiol (ORTHO-CYCLEN) 0.25-35 MG-MCG tablet Take 1 tablet by mouth daily       triamcinolone (KENALOG) 0.1 % external cream Use every other night on eyebrows 45 g 5     Current Facility-Administered Medications   Medication Dose Route Frequency Provider Last Rate Last Admin     triamcinolone acetonide (KENALOG-10) injection 20 mg  20 mg Intra-Lesional Once Sandhya Melvin  MD [place]         triamcinolone acetonide (KENALOG-10) injection 40 mg  40 mg Intra-Lesional Once Sandhya Melvin MD         triamcinolone acetonide (KENALOG-10) injection 40 mg  40 mg Intra-Lesional Once Sandhya Melvin MD         triamcinolone acetonide (KENALOG-10) injection 40 mg  40 mg Intra-Lesional Once Sandhya Melvin MD         triamcinolone acetonide (KENALOG-10) injection 40 mg  40 mg Intra-Lesional Once JOAQUIN Marquez MD         triamcinolone acetonide (KENALOG-10) injection 40 mg  40 mg Intra-Lesional Once Sandhya Melvin MD         triamcinolone acetonide (KENALOG-10) injection 40 mg  40 mg Intra-Lesional Once Sandhya Melvin MD         triamcinolone acetonide (KENALOG-10) injection 40 mg  40 mg Intra-Lesional Once Sandhya Melvin MD         triamcinolone acetonide (KENALOG-10) injection 40 mg  40 mg Intra-Lesional Once Sandhya Melvin MD         triamcinolone acetonide (KENALOG-10) injection 46 mg  46 mg Intra-Lesional Once Sandhya Melvin MD          Past Medical History:   Patient Active Problem List   Diagnosis     Alopecia areata     Medication monitoring encounter     No past medical history on file.    RENEE Harris MD  No address on file on close of this encounter.         Again, thank you for allowing me to participate in the care of your patient.        Sincerely,        Blanche Soliz PA-C    Electronically signed

## 2025-08-18 ENCOUNTER — OFFICE VISIT (OUTPATIENT)
Dept: DERMATOLOGY | Facility: CLINIC | Age: 25
End: 2025-08-18
Payer: COMMERCIAL

## 2025-08-18 DIAGNOSIS — L63.9 ALOPECIA AREATA: Primary | ICD-10-CM

## 2025-08-18 PROCEDURE — 1126F AMNT PAIN NOTED NONE PRSNT: CPT | Performed by: STUDENT IN AN ORGANIZED HEALTH CARE EDUCATION/TRAINING PROGRAM

## 2025-08-18 PROCEDURE — 11901 INJECT SKIN LESIONS >7: CPT | Performed by: STUDENT IN AN ORGANIZED HEALTH CARE EDUCATION/TRAINING PROGRAM

## 2025-08-18 RX ORDER — TRIAMCINOLONE ACETONIDE 10 MG/ML
20 INJECTION, SUSPENSION INTRA-ARTICULAR; INTRALESIONAL ONCE
Status: COMPLETED | OUTPATIENT
Start: 2025-08-18 | End: 2025-08-18

## 2025-08-18 RX ADMIN — TRIAMCINOLONE ACETONIDE 20 MG: 10 INJECTION, SUSPENSION INTRA-ARTICULAR; INTRALESIONAL at 14:27

## 2025-08-18 ASSESSMENT — PAIN SCALES - GENERAL: PAINLEVEL_OUTOF10: NO PAIN (0)
